# Patient Record
Sex: MALE | Race: WHITE | Employment: OTHER | URBAN - METROPOLITAN AREA
[De-identification: names, ages, dates, MRNs, and addresses within clinical notes are randomized per-mention and may not be internally consistent; named-entity substitution may affect disease eponyms.]

---

## 2017-01-27 PROCEDURE — 84146 ASSAY OF PROLACTIN: CPT | Performed by: INTERNAL MEDICINE

## 2017-03-03 ENCOUNTER — APPOINTMENT (OUTPATIENT)
Dept: LAB | Facility: HOSPITAL | Age: 72
End: 2017-03-03
Attending: INTERNAL MEDICINE
Payer: MEDICARE

## 2017-03-03 DIAGNOSIS — N62 GYNECOMASTIA: ICD-10-CM

## 2017-03-03 LAB
ESTRADIOL: 27.7 PG/ML (ref 0–39.8)
FSH: 9.7 MIU/ML
HCG QUANTITATIVE: <1 MIU/ML (ref ?–10)
LH: 1.9 MIU/ML (ref 3.1–34.6)

## 2017-03-03 PROCEDURE — 82670 ASSAY OF TOTAL ESTRADIOL: CPT

## 2017-03-03 PROCEDURE — 83002 ASSAY OF GONADOTROPIN (LH): CPT

## 2017-03-03 PROCEDURE — 83001 ASSAY OF GONADOTROPIN (FSH): CPT

## 2017-03-03 PROCEDURE — 84403 ASSAY OF TOTAL TESTOSTERONE: CPT

## 2017-03-03 PROCEDURE — 36415 COLL VENOUS BLD VENIPUNCTURE: CPT

## 2017-03-03 PROCEDURE — 84702 CHORIONIC GONADOTROPIN TEST: CPT

## 2017-03-03 PROCEDURE — 84402 ASSAY OF FREE TESTOSTERONE: CPT

## 2017-03-06 LAB
TESTOSTERONE TOTAL: 113 NG/DL
TESTOSTERONE, FREE -MS/MS: 17.3 PG/ML

## 2017-03-08 NOTE — PROGRESS NOTES
Quick Note:    [phone] (home)   [facility] [place] regarding Dr. Jenn Preston result note.  Hours and number given.    ______

## 2017-03-08 NOTE — PROGRESS NOTES
Quick Note:    Pt's testosterone level is decreased as to be expected given age and previous prostate cancer therapy.  Other labs including estrogen normal. As discussed at office visit, regarding pt's painful gynecomastia, pt may considering seeing oncolog

## 2017-03-09 NOTE — PROGRESS NOTES
Quick Note:    [phone] (home)   Telephone Information:  Mobile [phone]    [facility] regarding Dr. Bill Cassidy result note.  Hours and number given.    ______

## 2017-07-31 PROBLEM — B35.1 DERMATOPHYTOSIS, NAIL: Status: ACTIVE | Noted: 2017-07-31

## 2017-11-28 PROBLEM — N62 GYNECOMASTIA: Status: ACTIVE | Noted: 2017-11-28

## 2018-06-21 ENCOUNTER — OFFICE VISIT (OUTPATIENT)
Dept: WOUND CARE | Facility: HOSPITAL | Age: 73
End: 2018-06-21
Attending: NURSE PRACTITIONER
Payer: MEDICARE

## 2018-06-21 DIAGNOSIS — L97.811 NON-PRESSURE CHRONIC ULCER OF OTHER PART OF RIGHT LOWER LEG LIMITED TO BREAKDOWN OF SKIN (HCC): Primary | ICD-10-CM

## 2018-06-21 DIAGNOSIS — I87.2 PERIPHERAL VENOUS INSUFFICIENCY: ICD-10-CM

## 2018-06-21 PROCEDURE — 99214 OFFICE O/P EST MOD 30 MIN: CPT

## 2018-06-25 NOTE — PROGRESS NOTES
Subjective    Chief Complaint  This information was obtained from the patient  Patient is here for a wound to his right leg. Patients wife states that his skin is very dry and he begins to scratching. Patient begins to get scabs.  Patient has poor circulati Former smoker - quit more than 20 years ago, Marital Status - , Occupation - retired, Alcohol Use - none, Tobacco Use - occasional cigar, Caffeine Use - rarely , Lives in - condo    Past Medical History  This information was obtained from the tramaine cyclobenzaprine 10 mg tablet oral tablet oral  Keflex 500 mg capsule oral capsule oral twice daily for 7 days        Objective    Constitutional  BP WNL. Pulse RRR. RR within normal limits. Afebrile. Obesity BMI >30.  Alert, calm, well developed, in no appa Calf Measurement 40 cm from heel with left measurement of 28.9 cm   Ankle Measurement 13 cm from heel with left measurement of 46 cm  Right Extremity:   Calf Measurement 40 cm from heel with right measurement of 29.8 cm   Ankle Measurement 13 cm from heel S/S of Infection  Non-adherence    Additional Orders: Follow-Up Appointments  Return Appointment in 1 week. Care summary  Discussed the Plan of Care at bedside with patient.  The patient verbally acknowledges understanding of all instructions and all

## 2018-06-28 ENCOUNTER — OFFICE VISIT (OUTPATIENT)
Dept: WOUND CARE | Facility: HOSPITAL | Age: 73
End: 2018-06-28
Attending: NURSE PRACTITIONER
Payer: MEDICARE

## 2018-06-28 PROCEDURE — 99213 OFFICE O/P EST LOW 20 MIN: CPT

## 2018-06-28 NOTE — PROGRESS NOTES
Subjective    Chief Complaint  This information was obtained from the patient  Patient is here for a wound care follow up. He denies any pain to his wound.     Allergies  No known allergies    HPI  This information was obtained from the patient  The followi Musculoskeletal: Deformities, Muscle Weakness, Joint Swelling, Assistive Devices  Integumentary (Hair/Skin/Nails): Prone to Skin Tears  Neurological: Abnormal Gait, Numbness, Tingling, Weakness  Hematologic/Lymphatic: Swollen Glands  Psychiatric: Anxiety, Edema Assessment:  Left Extremity: Edema is present   Compression Device In Use: Yes   Device Used Correctly: Yes   Compression Device Used: Tubigrip or Tubifast   Calf Measurement 40 cm from heel   Ankle Measurement 13 cm from heel  Right Extremity: Edema

## 2018-07-05 ENCOUNTER — OFFICE VISIT (OUTPATIENT)
Dept: WOUND CARE | Facility: HOSPITAL | Age: 73
End: 2018-07-05
Attending: NURSE PRACTITIONER
Payer: MEDICARE

## 2018-07-05 DIAGNOSIS — I87.2 PERIPHERAL VENOUS INSUFFICIENCY: ICD-10-CM

## 2018-07-05 DIAGNOSIS — L97.811 NON-PRESSURE CHRONIC ULCER OF OTHER PART OF RIGHT LOWER LEG LIMITED TO BREAKDOWN OF SKIN (HCC): Primary | ICD-10-CM

## 2018-07-05 PROCEDURE — 29581 APPL MULTLAYER CMPRN SYS LEG: CPT

## 2018-07-05 NOTE — PROGRESS NOTES
Subjective    Chief Complaint  This information was obtained from the patient  Patient is here for a wound care follow up to right lower leg. He denies any pain to his wound. Pt comes in with tubigrips in place.  Hydrofera Blue ready was on incorrectly; ins Constitutional Symptoms (General Health):  Fatigue, Fever, Marked Weight Change  Eyes: Blurred Vision, Partial/Complete Blindness  Ear/Nose/Mouth/Throat: Hearing Loss / Aid  Respiratory: Cough, Shortness of Breath, Wheezing  Cardiovascular (Central/Peripher Wound #1 Right, Anterior Lower Leg is a chronic Full Thickness Edema and has received a status of Not Healed. Subsequent wound encounter measurements are 17cm length x 12.8cm width with no measurable depth, with an area of 217.6 sq cm .  No tunneling has be Wound #1 (Edema) is located on the right, anterior lower leg. A Multi-Layer Compression Wrap procedure was performed by Enrique Vallejo RN. A 2 Layers Coflex was applied with high 30-40 mmhg. The procedure was tolerated well.         [address]

## 2018-07-09 ENCOUNTER — OFFICE VISIT (OUTPATIENT)
Dept: WOUND CARE | Facility: HOSPITAL | Age: 73
End: 2018-07-09
Attending: NURSE PRACTITIONER
Payer: MEDICARE

## 2018-07-09 DIAGNOSIS — I87.2 PERIPHERAL VENOUS INSUFFICIENCY: ICD-10-CM

## 2018-07-09 DIAGNOSIS — L97.811 NON-PRESSURE CHRONIC ULCER OF OTHER PART OF RIGHT LOWER LEG LIMITED TO BREAKDOWN OF SKIN (HCC): Primary | ICD-10-CM

## 2018-07-09 PROCEDURE — 29581 APPL MULTLAYER CMPRN SYS LEG: CPT

## 2018-07-12 ENCOUNTER — OFFICE VISIT (OUTPATIENT)
Dept: WOUND CARE | Facility: HOSPITAL | Age: 73
End: 2018-07-12
Attending: NURSE PRACTITIONER
Payer: MEDICARE

## 2018-07-12 DIAGNOSIS — L97.811 NON-PRESSURE CHRONIC ULCER OF OTHER PART OF RIGHT LOWER LEG LIMITED TO BREAKDOWN OF SKIN (HCC): Primary | ICD-10-CM

## 2018-07-12 DIAGNOSIS — I87.2 PERIPHERAL VENOUS INSUFFICIENCY: ICD-10-CM

## 2018-07-12 PROCEDURE — 29581 APPL MULTLAYER CMPRN SYS LEG: CPT

## 2018-07-12 NOTE — PROGRESS NOTES
Subjective    Chief Complaint  This information was obtained from the patient  Patient is here for a wound care follow up. He denies any pain to the wound, and presents with wrap intact.     Allergies  No known allergies    HPI  This information was obtaine Patient denies complaints or symptoms related to:   Constitutional Symptoms (General Health):  Fatigue, Fever, Marked Weight Change  Eyes: Blurred Vision, Partial/Complete Blindness  Ear/Nose/Mouth/Throat: Hearing Loss / Aid  Respiratory: Cough, Shortness o Wound #1 Right, Anterior Lower Leg is a chronic Full Thickness Edema and has received a status of Not Healed. Subsequent wound encounter measurements are 6.2cm length x 8.7cm width with no measurable depth, with an area of 53.94 sq cm .  No tunneling has be Wound #1 (Edema) is located on the right, anterior lower leg. A Multi-Layer Compression Wrap procedure was performed by Devendra Siegel MA. A Multi-Layer Coflex was applied with . The procedure was tolerated well.         Plan    Wound Orders:  Wound #1 Ri

## 2018-07-16 ENCOUNTER — OFFICE VISIT (OUTPATIENT)
Dept: WOUND CARE | Facility: HOSPITAL | Age: 73
End: 2018-07-16
Attending: NURSE PRACTITIONER
Payer: MEDICARE

## 2018-07-16 DIAGNOSIS — I87.2 PERIPHERAL VENOUS INSUFFICIENCY: ICD-10-CM

## 2018-07-16 DIAGNOSIS — L97.811 NON-PRESSURE CHRONIC ULCER OF OTHER PART OF RIGHT LOWER LEG LIMITED TO BREAKDOWN OF SKIN (HCC): Primary | ICD-10-CM

## 2018-07-16 PROCEDURE — 29581 APPL MULTLAYER CMPRN SYS LEG: CPT

## 2018-07-19 ENCOUNTER — OFFICE VISIT (OUTPATIENT)
Dept: WOUND CARE | Facility: HOSPITAL | Age: 73
End: 2018-07-19
Attending: NURSE PRACTITIONER
Payer: MEDICARE

## 2018-07-19 DIAGNOSIS — L97.811 NON-PRESSURE CHRONIC ULCER OF OTHER PART OF RIGHT LOWER LEG LIMITED TO BREAKDOWN OF SKIN (HCC): Primary | ICD-10-CM

## 2018-07-19 DIAGNOSIS — I87.2 PERIPHERAL VENOUS INSUFFICIENCY: ICD-10-CM

## 2018-07-19 PROCEDURE — 29581 APPL MULTLAYER CMPRN SYS LEG: CPT

## 2018-07-23 ENCOUNTER — OFFICE VISIT (OUTPATIENT)
Dept: WOUND CARE | Facility: HOSPITAL | Age: 73
End: 2018-07-23
Attending: NURSE PRACTITIONER
Payer: MEDICARE

## 2018-07-23 DIAGNOSIS — I87.2 PERIPHERAL VENOUS INSUFFICIENCY: ICD-10-CM

## 2018-07-23 DIAGNOSIS — L97.811 NON-PRESSURE CHRONIC ULCER OF OTHER PART OF RIGHT LOWER LEG LIMITED TO BREAKDOWN OF SKIN (HCC): Primary | ICD-10-CM

## 2018-07-23 PROCEDURE — 99212 OFFICE O/P EST SF 10 MIN: CPT

## 2018-07-23 NOTE — PROGRESS NOTES
Subjective    Chief Complaint  This information was obtained from the patient  Patient is here for a follow up visit. Patients stated wrap was rolled on friday.      Allergies  No known allergies    HPI  This information was obtained from the patient  The f Neurological: Memory Loss (Short term memory loss)    Patient denies complaints or symptoms related to:   Constitutional Symptoms (General Health)  Eyes: Blurred Vision, Partial/Complete Blindness  Ear/Nose/Mouth/Throat: Hearing Loss / Aid  Respiratory: Co The periwound skin texture is normal. The periwound skin exhibited: Dry/Scaly, Hemosiderosis. The temperature of the periwound skin is WNL. Periwound skin does not exhibit signs or symptoms of infection.  Local Pulse is Normal.    Psychiatric:  Intact short Follow-Up Appointments  Discharge from Outpatient Services. Care summary  Discussed the Plan of Care at bedside with patient. The patient verbally acknowledges understanding of all instructions and all questions were answered.    Workflow: Venous  Perfus

## 2018-12-27 PROCEDURE — 84425 ASSAY OF VITAMIN B-1: CPT | Performed by: OTHER

## 2018-12-27 PROCEDURE — 86618 LYME DISEASE ANTIBODY: CPT | Performed by: OTHER

## 2018-12-27 PROCEDURE — 83921 ORGANIC ACID SINGLE QUANT: CPT | Performed by: OTHER

## 2019-10-15 ENCOUNTER — LAB ENCOUNTER (OUTPATIENT)
Dept: LAB | Facility: HOSPITAL | Age: 74
End: 2019-10-15
Attending: PAIN MEDICINE
Payer: MEDICARE

## 2019-10-15 DIAGNOSIS — N28.9 RENAL INSUFFICIENCY: Primary | ICD-10-CM

## 2019-10-15 PROCEDURE — 82565 ASSAY OF CREATININE: CPT

## 2019-10-15 PROCEDURE — 82570 ASSAY OF URINE CREATININE: CPT

## 2019-10-15 PROCEDURE — 36415 COLL VENOUS BLD VENIPUNCTURE: CPT

## 2020-07-30 PROBLEM — L97.909 CHRONIC SKIN ULCER OF LOWER LEG (HCC): Status: ACTIVE | Noted: 2020-07-30

## 2020-08-11 ENCOUNTER — OFFICE VISIT (OUTPATIENT)
Dept: WOUND CARE | Facility: HOSPITAL | Age: 75
End: 2020-08-11
Attending: NURSE PRACTITIONER
Payer: MEDICARE

## 2020-08-11 DIAGNOSIS — L97.929 CHRONIC VENOUS HYPERTENSION (IDIOPATHIC) WITH ULCER AND INFLAMMATION OF BILATERAL LOWER EXTREMITY (HCC): ICD-10-CM

## 2020-08-11 DIAGNOSIS — I89.0 LYMPHEDEMA, NOT ELSEWHERE CLASSIFIED: ICD-10-CM

## 2020-08-11 DIAGNOSIS — L97.812 NON-PRESSURE CHRONIC ULCER OF OTHER PART OF RIGHT LOWER LEG WITH FAT LAYER EXPOSED (HCC): ICD-10-CM

## 2020-08-11 DIAGNOSIS — L97.919 CHRONIC VENOUS HYPERTENSION (IDIOPATHIC) WITH ULCER AND INFLAMMATION OF BILATERAL LOWER EXTREMITY (HCC): ICD-10-CM

## 2020-08-11 DIAGNOSIS — L97.802 NON-PRESSURE CHRONIC ULCER OF OTHER PART OF UNSPECIFIED LOWER LEG WITH FAT LAYER EXPOSED (HCC): Primary | ICD-10-CM

## 2020-08-11 DIAGNOSIS — I87.333 CHRONIC VENOUS HYPERTENSION (IDIOPATHIC) WITH ULCER AND INFLAMMATION OF BILATERAL LOWER EXTREMITY (HCC): ICD-10-CM

## 2020-08-11 PROCEDURE — 29581 APPL MULTLAYER CMPRN SYS LEG: CPT

## 2020-08-11 PROCEDURE — 99215 OFFICE O/P EST HI 40 MIN: CPT

## 2020-08-11 NOTE — PROGRESS NOTES
Subjective    Chief Complaint  This information was obtained from the patient, chart  Patient is here for treatment and management of a traumatic wounds to BLE. Per spouse,patient scratches his legs all the time and doesn't wear his compression stockings. medical history of:  Prostate cancer  Neuropathy  Thyroid issue (young age)  RSD    Surgical History  This information was obtained from the patient, chart  Patient has a surgical history of:   Total knee replacement  Colonoscopy  Upper GI, endoscopy    Rev mg tablet  zolpidem - oral 10 mg tablet  venlafaxine - oral 75 mg capsule,extended release 24hr  cyclobenzaprine - oral 10 mg tablet  nystatin - topical 100,000 unit/gram cream        Objective    Wound Assessment(s)  Wound #2 Left, Anterior Lower Leg is a Normal.    Wound #4 Right, Lateral Lower Leg is an acute Full Thickness Venous Ulcer and has received a status of Not Healed.  Initial wound encounter measurements are 1cm length x 0.6cm width x 0.1cm depth, with an area of 0.6 sq cm and a volume of 0.06 cu unlabored. Temperature wnl. elevated BMI. Appearance neat and clean. Appears in no acute distress. Well nourished and well developed. Respiratory:  Respiratory effort is easy and symmetric bilaterally. Rate is normal at rest and on room air .  Bilateral procedure was tolerated well. Plan    Wound Orders:  Wound #2 Left, Anterior Lower Leg    Wound Cleansing & Dressings:  May shower with protection.   Cleanse with Vashe  Steroid cream/ointment  Kerramax/Super absorbent  Change Dressing Every: - twice Albumin Date and Value: - Jan 2020 albumin 3.0/tp6.7        Discussed with patient the aspects of wound healing including:  blood flow in/out (arterial vs venous vs lymph) discussed the importance of compression, wound bed optimization including moist woun

## 2020-08-14 ENCOUNTER — OFFICE VISIT (OUTPATIENT)
Dept: WOUND CARE | Facility: HOSPITAL | Age: 75
End: 2020-08-14
Attending: NURSE PRACTITIONER
Payer: MEDICARE

## 2020-08-14 DIAGNOSIS — L97.802 NON-PRESSURE CHRONIC ULCER OF OTHER PART OF UNSPECIFIED LOWER LEG WITH FAT LAYER EXPOSED (HCC): Primary | ICD-10-CM

## 2020-08-14 DIAGNOSIS — I87.333 CHRONIC VENOUS HYPERTENSION (IDIOPATHIC) WITH ULCER AND INFLAMMATION OF BILATERAL LOWER EXTREMITY (HCC): ICD-10-CM

## 2020-08-14 DIAGNOSIS — L97.812 NON-PRESSURE CHRONIC ULCER OF OTHER PART OF RIGHT LOWER LEG WITH FAT LAYER EXPOSED (HCC): ICD-10-CM

## 2020-08-14 DIAGNOSIS — I89.0 LYMPHEDEMA, NOT ELSEWHERE CLASSIFIED: ICD-10-CM

## 2020-08-14 DIAGNOSIS — L97.929 CHRONIC VENOUS HYPERTENSION (IDIOPATHIC) WITH ULCER AND INFLAMMATION OF BILATERAL LOWER EXTREMITY (HCC): ICD-10-CM

## 2020-08-14 DIAGNOSIS — L97.919 CHRONIC VENOUS HYPERTENSION (IDIOPATHIC) WITH ULCER AND INFLAMMATION OF BILATERAL LOWER EXTREMITY (HCC): ICD-10-CM

## 2020-08-14 PROCEDURE — 29581 APPL MULTLAYER CMPRN SYS LEG: CPT

## 2020-08-18 ENCOUNTER — APPOINTMENT (OUTPATIENT)
Dept: WOUND CARE | Facility: HOSPITAL | Age: 75
End: 2020-08-18
Attending: NURSE PRACTITIONER
Payer: MEDICARE

## 2020-08-19 ENCOUNTER — APPOINTMENT (OUTPATIENT)
Dept: WOUND CARE | Facility: HOSPITAL | Age: 75
End: 2020-08-19
Attending: NURSE PRACTITIONER
Payer: MEDICARE

## 2020-08-21 ENCOUNTER — OFFICE VISIT (OUTPATIENT)
Dept: WOUND CARE | Facility: HOSPITAL | Age: 75
End: 2020-08-21
Attending: NURSE PRACTITIONER
Payer: MEDICARE

## 2020-08-21 DIAGNOSIS — L97.812 NON-PRESSURE CHRONIC ULCER OF OTHER PART OF RIGHT LOWER LEG WITH FAT LAYER EXPOSED (HCC): ICD-10-CM

## 2020-08-21 DIAGNOSIS — I89.0 LYMPHEDEMA, NOT ELSEWHERE CLASSIFIED: ICD-10-CM

## 2020-08-21 DIAGNOSIS — I87.333 CHRONIC VENOUS HYPERTENSION (IDIOPATHIC) WITH ULCER AND INFLAMMATION OF BILATERAL LOWER EXTREMITY (HCC): ICD-10-CM

## 2020-08-21 DIAGNOSIS — L97.929 CHRONIC VENOUS HYPERTENSION (IDIOPATHIC) WITH ULCER AND INFLAMMATION OF BILATERAL LOWER EXTREMITY (HCC): ICD-10-CM

## 2020-08-21 DIAGNOSIS — L97.802 NON-PRESSURE CHRONIC ULCER OF OTHER PART OF UNSPECIFIED LOWER LEG WITH FAT LAYER EXPOSED (HCC): Primary | ICD-10-CM

## 2020-08-21 DIAGNOSIS — L97.919 CHRONIC VENOUS HYPERTENSION (IDIOPATHIC) WITH ULCER AND INFLAMMATION OF BILATERAL LOWER EXTREMITY (HCC): ICD-10-CM

## 2020-08-21 PROCEDURE — 29581 APPL MULTLAYER CMPRN SYS LEG: CPT

## 2020-08-21 NOTE — PROGRESS NOTES
Subjective    Chief Complaint  This information was obtained from the patient, chart  Patient is here for a follow visit for BLE wounds. Allergies  No known allergies    HPI  This information was obtained from the patient, caregiver and chart.   The foll treated)  Cardiovascular (Central/Peripheral): Edema  Musculoskeletal: Other (right knee surgeries x 2)  Integumentary (Hair/Skin/Nails):  Hyperpigmentation, Ulcers  Neurological: Memory Loss (Short term memory loss), Other (reflex sympathetic dystrophy)  P exhibit: Brawny Induration. The temperature of the periwound skin is Warm. Periwound skin does not exhibit signs or symptoms of infection.  Local Pulse is Normal.    Wound #3 Right, Medial Lower Leg is an acute Full Thickness Venous Ulcer and has received a agitation. Calm, cooperative, and communicative. Appropriate interactions and affect.         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 Wound type: - venous/lymphadema  Wound improving. No s/s of infection. Perfusion assessed by palpation of pulses.   Last Albumin Date and Value: - Jan 2020 albumin 3.0/tp6.7    Follow-Up Appointments:  A follow-up appointment carlos alberto

## 2020-08-26 ENCOUNTER — APPOINTMENT (OUTPATIENT)
Dept: WOUND CARE | Facility: HOSPITAL | Age: 75
End: 2020-08-26
Attending: NURSE PRACTITIONER
Payer: MEDICARE

## 2020-08-28 ENCOUNTER — OFFICE VISIT (OUTPATIENT)
Dept: WOUND CARE | Facility: HOSPITAL | Age: 75
End: 2020-08-28
Attending: NURSE PRACTITIONER
Payer: MEDICARE

## 2020-08-28 DIAGNOSIS — L97.812 NON-PRESSURE CHRONIC ULCER OF OTHER PART OF RIGHT LOWER LEG WITH FAT LAYER EXPOSED (HCC): ICD-10-CM

## 2020-08-28 DIAGNOSIS — L97.919 CHRONIC VENOUS HYPERTENSION (IDIOPATHIC) WITH ULCER AND INFLAMMATION OF BILATERAL LOWER EXTREMITY (HCC): ICD-10-CM

## 2020-08-28 DIAGNOSIS — I89.0 LYMPHEDEMA, NOT ELSEWHERE CLASSIFIED: ICD-10-CM

## 2020-08-28 DIAGNOSIS — I87.333 CHRONIC VENOUS HYPERTENSION (IDIOPATHIC) WITH ULCER AND INFLAMMATION OF BILATERAL LOWER EXTREMITY (HCC): ICD-10-CM

## 2020-08-28 DIAGNOSIS — L97.802 NON-PRESSURE CHRONIC ULCER OF OTHER PART OF UNSPECIFIED LOWER LEG WITH FAT LAYER EXPOSED (HCC): Primary | ICD-10-CM

## 2020-08-28 DIAGNOSIS — L97.929 CHRONIC VENOUS HYPERTENSION (IDIOPATHIC) WITH ULCER AND INFLAMMATION OF BILATERAL LOWER EXTREMITY (HCC): ICD-10-CM

## 2020-08-28 PROCEDURE — 29581 APPL MULTLAYER CMPRN SYS LEG: CPT

## 2020-08-28 NOTE — PROGRESS NOTES
Subjective    Chief Complaint  This information was obtained from the patient, chart  Patient is here for a follow visit for BLE wounds. Allergies  No known allergies    HPI  This information was obtained from the patient, caregiver and chart.   The foll wrap falling/being removed, maybe the increase in humidity/heat and new wrap,  patient now has the area open on one leg, both legs have an increase in edema. will re-wrap and have him come back early next week for transition.     Review of Systems (ROS)  T volume of 0.06 cubic cm. No tunneling has been noted. No sinus tract has been noted. No undermining has been noted. There is a small amount of sero-sanguineous drainage noted which has no odor. The patient reports a wound pain of level 0/10.  The wound laura palpable bilaterally. Extremities + scant varicosities, edema bilaterally increased, hyperpigmentation, excoriations on left. Capillary refill < 3 seconds. Digits are warm. toenails are wnl for color, thickness and hygeine. no hairgrowth on legs and feet. Yanelis Mcdonald week.    Misc/Additional Orders:  Supplement with a daily multivitamin  Increase protein into diet  Start or continue taking Jarrett  Decrease salt intake  S/S of Infection  Non-adherence    Wound #3 Right, Medial Lower Leg    Compression Therapy:  Yue

## 2020-09-01 ENCOUNTER — OFFICE VISIT (OUTPATIENT)
Dept: WOUND CARE | Facility: HOSPITAL | Age: 75
End: 2020-09-01
Attending: NURSE PRACTITIONER
Payer: MEDICARE

## 2020-09-01 DIAGNOSIS — I87.333 CHRONIC VENOUS HYPERTENSION (IDIOPATHIC) WITH ULCER AND INFLAMMATION OF BILATERAL LOWER EXTREMITY (HCC): ICD-10-CM

## 2020-09-01 DIAGNOSIS — L97.919 CHRONIC VENOUS HYPERTENSION (IDIOPATHIC) WITH ULCER AND INFLAMMATION OF BILATERAL LOWER EXTREMITY (HCC): ICD-10-CM

## 2020-09-01 DIAGNOSIS — L97.929 CHRONIC VENOUS HYPERTENSION (IDIOPATHIC) WITH ULCER AND INFLAMMATION OF BILATERAL LOWER EXTREMITY (HCC): ICD-10-CM

## 2020-09-01 DIAGNOSIS — L97.812 NON-PRESSURE CHRONIC ULCER OF OTHER PART OF RIGHT LOWER LEG WITH FAT LAYER EXPOSED (HCC): ICD-10-CM

## 2020-09-01 DIAGNOSIS — L97.802 NON-PRESSURE CHRONIC ULCER OF OTHER PART OF UNSPECIFIED LOWER LEG WITH FAT LAYER EXPOSED (HCC): Primary | ICD-10-CM

## 2020-09-01 DIAGNOSIS — I89.0 LYMPHEDEMA, NOT ELSEWHERE CLASSIFIED: ICD-10-CM

## 2020-09-01 PROCEDURE — 99213 OFFICE O/P EST LOW 20 MIN: CPT

## 2020-09-01 NOTE — PROGRESS NOTES
Subjective    Chief Complaint  This information was obtained from the patient, chart  Patient is here for a follow visit for BLE wounds.  He denies any issues with the wrap and no pain on the wound    Allergies  No known allergies    HPI  This information w bring his compression stockings with.  however due to the wrap falling/being removed, maybe the increase in humidity/heat and new wrap,  patient now has the area open on one leg, both legs have an increase in edema.   will re-wrap and have him come back ear Information  Medication reconciliation completed at today's visit. : Yes        Objective    Wound Assessment(s)  Wound #2 Left, Anterior Lower Leg is an acute Full Thickness Venous Ulcer and has received an outcome of Resolved.  Subsequent wound encounter Calm, cooperative, and communicative. Appropriate interactions and affect.         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

## 2020-12-03 ENCOUNTER — OFFICE VISIT (OUTPATIENT)
Dept: NEUROLOGY | Facility: CLINIC | Age: 75
End: 2020-12-03
Payer: MEDICARE

## 2020-12-03 VITALS
RESPIRATION RATE: 16 BRPM | HEART RATE: 96 BPM | BODY MASS INDEX: 42 KG/M2 | SYSTOLIC BLOOD PRESSURE: 140 MMHG | DIASTOLIC BLOOD PRESSURE: 90 MMHG | WEIGHT: 308 LBS

## 2020-12-03 DIAGNOSIS — M79.662 PAIN AND SWELLING OF LOWER LEG, LEFT: Primary | ICD-10-CM

## 2020-12-03 DIAGNOSIS — M79.89 PAIN AND SWELLING OF RIGHT LOWER LEG: ICD-10-CM

## 2020-12-03 DIAGNOSIS — G90.521 COMPLEX REGIONAL PAIN SYNDROME TYPE 1 OF RIGHT LOWER EXTREMITY: ICD-10-CM

## 2020-12-03 DIAGNOSIS — M79.89 PAIN AND SWELLING OF LOWER LEG, LEFT: Primary | ICD-10-CM

## 2020-12-03 DIAGNOSIS — M79.661 PAIN AND SWELLING OF RIGHT LOWER LEG: ICD-10-CM

## 2020-12-03 PROCEDURE — 99203 OFFICE O/P NEW LOW 30 MIN: CPT | Performed by: PHYSICIAN ASSISTANT

## 2020-12-03 NOTE — PROGRESS NOTES
[facility] with [address] [place]  11/24/1945  Primary Care Provider:  Saida Bustos MD    12/3/2020  Accompanied visit: Yes- Wife        77year old male patient being seen for Take 1 capsule (0.4 mg total) by mouth nightly., Disp: 60 capsule, Rfl: 3  •  cloNIDine (CATAPRES) 0.05 mg Oral Tab, Take 0.025 mg by mouth 2 (two) times daily. , Disp: , Rfl:   PRN:     Allergies:  No Known Allergies         EXAM:  /90 (BP Location: E[place]a luisari. Plan was discussed with patient and wife and they expressed full understanding.

## 2020-12-05 PROBLEM — G90.529 CRPS (COMPLEX REGIONAL PAIN SYNDROME), LOWER LIMB: Status: ACTIVE | Noted: 2020-12-05

## 2020-12-14 ENCOUNTER — TELEPHONE (OUTPATIENT)
Dept: PAIN CLINIC | Facility: CLINIC | Age: 75
End: 2020-12-14

## 2020-12-15 PROBLEM — R60.0 BILATERAL LEG EDEMA: Status: ACTIVE | Noted: 2020-12-15

## 2021-01-04 ENCOUNTER — OFFICE VISIT (OUTPATIENT)
Dept: PAIN CLINIC | Facility: CLINIC | Age: 76
End: 2021-01-04
Payer: MEDICARE

## 2021-01-04 VITALS
WEIGHT: 300 LBS | RESPIRATION RATE: 16 BRPM | SYSTOLIC BLOOD PRESSURE: 138 MMHG | HEIGHT: 72 IN | OXYGEN SATURATION: 96 % | BODY MASS INDEX: 40.63 KG/M2 | HEART RATE: 78 BPM | DIASTOLIC BLOOD PRESSURE: 86 MMHG

## 2021-01-04 DIAGNOSIS — G57.70 COMPLEX REGIONAL PAIN SYNDROME TYPE 2 OF LOWER EXTREMITY, UNSPECIFIED LATERALITY: Primary | ICD-10-CM

## 2021-01-04 PROCEDURE — 99203 OFFICE O/P NEW LOW 30 MIN: CPT | Performed by: ANESTHESIOLOGY

## 2021-01-04 NOTE — H&P
Name: Roselyn Armstrong III   : 1945   DOS: 2021     Chief complaint: Right lower extremity pain    History of present illness:   Betsy Crain is a 77year old male accompanied by his wife today for initial consultation regarding complex r Past Surgical History:   Procedure Laterality Date   • COLONOSCOPY N/A 12/2/2014    Performed by Oniel Cantu MD at [facility] ENDOSCOPY   • ESOPHAGOGASTRODUODENOSCOPY (EGD) N/A 12/2/2014    Performed by Oniel Cantu MD at [facility] ENDOSCOPY   • SKIN SURGERY R pressure without any symptomatic complaint. Radiology diagnostic studies:   No imaging    Assessment:  Complex regional pain syndrome type 2 of lower extremity, unspecified laterality  (primary encounter diagnosis).       Plan: The patient is a 66-year-o

## 2021-01-04 NOTE — PROGRESS NOTES
Location of Pain: right knee    Date Pain Began: 2000          Work Related:   No        Receiving Work Comp/Disability:   No    Numeric Rating Scale:  Pain at Present:  1

## 2021-02-11 DIAGNOSIS — Z23 NEED FOR VACCINATION: ICD-10-CM

## 2021-04-12 PROBLEM — L97.919 CHRONIC VENOUS HYPERTENSION (IDIOPATHIC) WITH ULCER AND INFLAMMATION OF BILATERAL LOWER EXTREMITY (HCC): Status: ACTIVE | Noted: 2021-04-12

## 2021-04-12 PROBLEM — L97.929 CHRONIC VENOUS HYPERTENSION (IDIOPATHIC) WITH ULCER AND INFLAMMATION OF BILATERAL LOWER EXTREMITY (HCC): Status: ACTIVE | Noted: 2021-04-12

## 2021-04-12 PROBLEM — I87.333 CHRONIC VENOUS HYPERTENSION (IDIOPATHIC) WITH ULCER AND INFLAMMATION OF BILATERAL LOWER EXTREMITY (HCC): Status: ACTIVE | Noted: 2021-04-12

## 2021-04-23 ENCOUNTER — APPOINTMENT (OUTPATIENT)
Dept: WOUND CARE | Facility: HOSPITAL | Age: 76
End: 2021-04-23
Attending: NURSE PRACTITIONER
Payer: MEDICARE

## 2021-10-05 PROBLEM — E66.01 OBESITY, MORBID (HCC): Status: ACTIVE | Noted: 2021-10-05

## 2021-10-12 NOTE — ED PROVIDER NOTES
Patient Seen in: [facility] Emergency Department      History   Patient presents with:  Eval-P    Stated Complaint: hallucinations    Subjective:   HPI    70-year-old male presents emergency department by ambulance for evaluation.   Initially it was reviewed and negative except as noted above.     Physical Exam     ED Triage Vitals   BP 10/12/21 0305 (!) 167/85   Pulse 10/12/21 0305 86   Resp 10/12/21 0309 17   Temp 10/12/21 0309 97.8 °F (36.6 °C)   Temp src 10/12/21 0309 Temporal   SpO2 10/12/21 0305 WITH DIFFERENTIAL WITH PLATELET   URINALYSIS WITH CULTURE REFLEX   ETHYL ALCOHOL   DRUG SCREEN 7 W/OUT CONFIRMATION, URINE   RAPID SARS-COV-2 BY PCR          Patient was evaluated in the emergency department initially had psychiatric labs drawn however pat errors may occur. When identified these errors have been corrected.  While every attempt is made to correct errors during dictation discrepancies may still exist                             Disposition and Plan     Clinical Impression:  Agitation  (primary
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
## 2021-11-20 ENCOUNTER — APPOINTMENT (OUTPATIENT)
Dept: GENERAL RADIOLOGY | Facility: HOSPITAL | Age: 76
End: 2021-11-20
Attending: EMERGENCY MEDICINE
Payer: MEDICARE

## 2021-11-20 ENCOUNTER — HOSPITAL ENCOUNTER (EMERGENCY)
Facility: HOSPITAL | Age: 76
Discharge: NURSING, REHAB OR LONG-TERM CARE | End: 2021-11-21
Attending: EMERGENCY MEDICINE
Payer: MEDICARE

## 2021-11-20 DIAGNOSIS — F41.9 ANXIETY: ICD-10-CM

## 2021-11-20 DIAGNOSIS — R45.1 AGITATION: ICD-10-CM

## 2021-11-20 DIAGNOSIS — F22 PARANOID DELUSION (HCC): ICD-10-CM

## 2021-11-20 DIAGNOSIS — K59.00 CONSTIPATION, UNSPECIFIED CONSTIPATION TYPE: Primary | ICD-10-CM

## 2021-11-20 PROCEDURE — 74018 RADEX ABDOMEN 1 VIEW: CPT | Performed by: EMERGENCY MEDICINE

## 2021-11-20 RX ORDER — LORAZEPAM 2 MG/ML
1 INJECTION INTRAMUSCULAR ONCE
Status: COMPLETED | OUTPATIENT
Start: 2021-11-20 | End: 2021-11-20

## 2021-11-20 RX ORDER — ACETAMINOPHEN 325 MG/1
650 TABLET ORAL ONCE
Status: COMPLETED | OUTPATIENT
Start: 2021-11-20 | End: 2021-11-20

## 2021-11-20 RX ORDER — ZOLPIDEM TARTRATE 5 MG/1
20 TABLET ORAL NIGHTLY PRN
Status: DISCONTINUED | OUTPATIENT
Start: 2021-11-20 | End: 2021-11-21

## 2021-11-20 RX ORDER — LORAZEPAM 2 MG/ML
0.5 INJECTION INTRAMUSCULAR ONCE
Status: COMPLETED | OUTPATIENT
Start: 2021-11-20 | End: 2021-11-20

## 2021-11-20 RX ORDER — LORAZEPAM 2 MG/ML
INJECTION INTRAMUSCULAR
Status: COMPLETED
Start: 2021-11-20 | End: 2021-11-20

## 2021-11-20 RX ORDER — VENLAFAXINE HYDROCHLORIDE 75 MG/1
75 CAPSULE, EXTENDED RELEASE ORAL 2 TIMES DAILY
Status: DISCONTINUED | OUTPATIENT
Start: 2021-11-20 | End: 2021-11-21

## 2021-11-20 RX ORDER — TRAZODONE HYDROCHLORIDE 50 MG/1
100 TABLET ORAL NIGHTLY
Status: DISCONTINUED | OUTPATIENT
Start: 2021-11-20 | End: 2021-11-21

## 2021-11-20 RX ORDER — QUETIAPINE 25 MG/1
25 TABLET, FILM COATED ORAL NIGHTLY
Status: DISCONTINUED | OUTPATIENT
Start: 2021-11-20 | End: 2021-11-21

## 2021-11-20 NOTE — ED INITIAL ASSESSMENT (HPI)
Pt arrives to the ED following an acute exacerbation of his dementia this morning. Per pt's wife and Tori Mckeon, pt stated that he did not \"want to be arrested for murdering people\".  Pt arrives stating \"I am sorry, I do not remember this morning, I k

## 2021-11-20 NOTE — ED PROVIDER NOTES
Patient Seen in: [facility] Emergency Department      History   Patient presents with:  Eval-P    Stated Complaint:     Subjective:   HPI    Patient presents for mental health assessment.   The patient has dementia and has been having some behavioral Years: 20.00        Pack years: 20        Types: Cigarettes, Cigars      Smokeless tobacco: Never Used      Tobacco comment: quit smoking cigars in 2013 quit cigarrettes in 1970\"s    Vaping Use      Vaping Use: Never used    Alcohol use: No    Drug use: N Notable for the following components:    WBC 11.7 (*)     Neutrophil Absolute Prelim 9.32 (*)     Neutrophil Absolute 9.32 (*)     All other components within normal limits   ETHYL ALCOHOL - Normal   LIPASE - Normal   SARS-COV-2 BY PCR (GENEXPERT) - Normal given Tylenol. He started to become a little anxious and agitated and was sedated with Ativan as needed.        MDM      The patient has been assessed by crisis and they agree with the need for inpatient mental health treatment to hopefully initiate some m

## 2021-11-20 NOTE — ED QUICK NOTES
Patient's items secured in smart safe bag #[id number]. Items include shirt, sweatshirt, jeans, socks and shoes. Phone is with wife.

## 2021-11-21 VITALS
WEIGHT: 280 LBS | BODY MASS INDEX: 37.11 KG/M2 | OXYGEN SATURATION: 95 % | DIASTOLIC BLOOD PRESSURE: 99 MMHG | HEIGHT: 73 IN | RESPIRATION RATE: 18 BRPM | SYSTOLIC BLOOD PRESSURE: 139 MMHG | TEMPERATURE: 99 F | HEART RATE: 85 BPM

## 2021-11-21 PROBLEM — F03.92: Status: ACTIVE | Noted: 2021-11-21

## 2021-11-21 PROBLEM — F03.90: Status: ACTIVE | Noted: 2021-11-21

## 2021-11-21 PROBLEM — F22: Status: ACTIVE | Noted: 2021-11-21

## 2021-11-21 PROCEDURE — 90792 PSYCH DIAG EVAL W/MED SRVCS: CPT | Performed by: OTHER

## 2021-11-21 RX ORDER — LORAZEPAM 2 MG/ML
1 INJECTION INTRAMUSCULAR EVERY 4 HOURS PRN
Status: DISCONTINUED | OUTPATIENT
Start: 2021-11-21 | End: 2021-11-21

## 2021-11-21 RX ORDER — HALOPERIDOL 5 MG/ML
2 INJECTION INTRAMUSCULAR ONCE
Status: COMPLETED | OUTPATIENT
Start: 2021-11-21 | End: 2021-11-21

## 2021-11-21 RX ORDER — ACETAMINOPHEN 500 MG
1000 TABLET ORAL ONCE
Status: DISCONTINUED | OUTPATIENT
Start: 2021-11-21 | End: 2021-11-21

## 2021-11-21 RX ORDER — ACETAMINOPHEN 325 MG/1
650 TABLET ORAL ONCE
Status: COMPLETED | OUTPATIENT
Start: 2021-11-21 | End: 2021-11-21

## 2021-11-21 RX ORDER — LORAZEPAM 1 MG/1
1 TABLET ORAL EVERY 4 HOURS PRN
Status: DISCONTINUED | OUTPATIENT
Start: 2021-11-21 | End: 2021-11-21

## 2021-11-21 RX ORDER — VENLAFAXINE HYDROCHLORIDE 75 MG/1
150 CAPSULE, EXTENDED RELEASE ORAL 2 TIMES DAILY
Status: DISCONTINUED | OUTPATIENT
Start: 2021-11-21 | End: 2021-11-21

## 2021-11-21 RX ORDER — QUETIAPINE 25 MG/1
50 TABLET, FILM COATED ORAL NIGHTLY
Status: DISCONTINUED | OUTPATIENT
Start: 2021-11-21 | End: 2021-11-21

## 2021-11-21 RX ORDER — ACETAMINOPHEN 500 MG
1000 TABLET ORAL ONCE
Status: COMPLETED | OUTPATIENT
Start: 2021-11-21 | End: 2021-11-21

## 2021-11-21 RX ORDER — HALOPERIDOL 5 MG/ML
INJECTION INTRAMUSCULAR
Status: COMPLETED
Start: 2021-11-21 | End: 2021-11-21

## 2021-11-21 RX ORDER — CLONIDINE HYDROCHLORIDE 0.1 MG/1
0.05 TABLET ORAL 2 TIMES DAILY
Status: DISCONTINUED | OUTPATIENT
Start: 2021-11-21 | End: 2021-11-21

## 2021-11-21 RX ORDER — GABAPENTIN 400 MG/1
800 CAPSULE ORAL DAILY
Status: DISCONTINUED | OUTPATIENT
Start: 2021-11-21 | End: 2021-11-21

## 2021-11-21 RX ORDER — LORAZEPAM 2 MG/ML
INJECTION INTRAMUSCULAR
Status: COMPLETED
Start: 2021-11-21 | End: 2021-11-21

## 2021-11-21 RX ORDER — HALOPERIDOL 2 MG/1
2 TABLET ORAL EVERY 4 HOURS PRN
Status: DISCONTINUED | OUTPATIENT
Start: 2021-11-21 | End: 2021-11-21

## 2021-11-21 RX ORDER — LORAZEPAM 2 MG/ML
2 INJECTION INTRAMUSCULAR ONCE
Status: COMPLETED | OUTPATIENT
Start: 2021-11-21 | End: 2021-11-21

## 2021-11-21 RX ORDER — GABAPENTIN 400 MG/1
400 CAPSULE ORAL NIGHTLY
Status: DISCONTINUED | OUTPATIENT
Start: 2021-11-21 | End: 2021-11-21

## 2021-11-21 RX ORDER — HALOPERIDOL 5 MG/ML
2 INJECTION INTRAMUSCULAR EVERY 4 HOURS PRN
Status: DISCONTINUED | OUTPATIENT
Start: 2021-11-21 | End: 2021-11-21

## 2021-11-21 RX ORDER — TAMSULOSIN HYDROCHLORIDE 0.4 MG/1
0.4 CAPSULE ORAL NIGHTLY
Status: DISCONTINUED | OUTPATIENT
Start: 2021-11-21 | End: 2021-11-21

## 2021-11-21 NOTE — CERTIFICATION
Ref: [address] [facility] 5/3-403, 5/3-602, 5/3-607, 5/3-610    5/3-702, 5/3-813, 5/4-306, 5/4-402, 5/4-403    5/4-405, 5/4-501, 5/4-611, 3/6-714   Inpatient Certificate  Re: Roselyn Armstrong III    (name)     I personally informed the above-named individual of the treated on an inpatient basis, is reasonably expected based on his or her behavioral history, to suffer mental or emotional deterioration and is reasonably expected, after such deterioration, to meet the criteria of either paragraph one or paragraph two ab

## 2021-11-21 NOTE — ED QUICK NOTES
EMS arrives to transport pt to [facility]. Pt became agitated and attempted to leave without approval through ambulance bay. Several staff present attempting to have pt return to ER and room. MD notified and orders received.

## 2021-11-21 NOTE — BH PROGRESS NOTE
Writer spoke with Dr. Kip Cabral who accepted the case. Writer gave report to Brie Juárez RN on Geriatric Unit. Writer spoke with Jewels Devi RN from ED and gave contact information to give N2N.      Around 1300: Spoke with Jewels Devi who states transport has been set up
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
## 2021-11-22 PROBLEM — E66.9 OBESITY: Status: ACTIVE | Noted: 2021-11-22

## 2021-11-23 PROBLEM — E78.00 HYPERCHOLESTEROLEMIA WITHOUT HYPERTRIGLYCERIDEMIA: Status: ACTIVE | Noted: 2021-11-23

## 2021-11-25 PROBLEM — F22: Status: RESOLVED | Noted: 2021-11-21 | Resolved: 2021-11-25

## 2021-11-25 PROBLEM — F03.90: Status: RESOLVED | Noted: 2021-11-21 | Resolved: 2021-11-25

## 2021-11-25 PROBLEM — F03.92: Status: RESOLVED | Noted: 2021-11-21 | Resolved: 2021-11-25

## 2021-12-03 PROBLEM — E66.01 OBESITY, MORBID (HCC): Status: RESOLVED | Noted: 2021-10-05 | Resolved: 2021-12-03

## 2021-12-03 PROBLEM — I10 HYPERTENSION: Status: ACTIVE | Noted: 2021-12-03

## 2021-12-22 ENCOUNTER — OFFICE VISIT (OUTPATIENT)
Dept: PAIN CLINIC | Facility: CLINIC | Age: 76
End: 2021-12-22
Payer: MEDICARE

## 2021-12-22 VITALS
OXYGEN SATURATION: 98 % | DIASTOLIC BLOOD PRESSURE: 80 MMHG | WEIGHT: 290 LBS | HEART RATE: 75 BPM | SYSTOLIC BLOOD PRESSURE: 120 MMHG | BODY MASS INDEX: 38 KG/M2

## 2021-12-22 DIAGNOSIS — G57.70 COMPLEX REGIONAL PAIN SYNDROME TYPE 2 OF LOWER EXTREMITY, UNSPECIFIED LATERALITY: Primary | ICD-10-CM

## 2021-12-22 PROCEDURE — 99214 OFFICE O/P EST MOD 30 MIN: CPT | Performed by: ANESTHESIOLOGY

## 2021-12-22 RX ORDER — CLONIDINE HYDROCHLORIDE 0.1 MG/1
0.1 TABLET ORAL 2 TIMES DAILY
COMMUNITY

## 2021-12-22 RX ORDER — GABAPENTIN 300 MG/1
300 CAPSULE ORAL 3 TIMES DAILY
COMMUNITY
End: 2021-12-28

## 2021-12-22 NOTE — PROGRESS NOTES
Patient presents in office today with reported pain in right knee    Current pain level reported = 0/10    Last reported dose of nothing      Narcotic Contract renewal na    Urine Drug screen na

## 2021-12-22 NOTE — PROGRESS NOTES
Name: Karen Chavez III   : 1945   DOS: 2021     Pain Clinic Follow Up Visit:   Patient presents with:   Follow - Up: discuss right knee pain       Noah Richardson is a 77year old male who presents today for follow-up regarding right k total) by mouth nightly. 30 tablet 3   • OLANZapine 5 MG Oral Tab Take 1 tablet (5 mg total) by mouth daily.  30 tablet 3   • venlafaxine 150 MG Oral Capsule SR 24 Hr Take 2 capsules (300 mg total) by mouth daily with breakfast. 60 capsule 3   • tamsulosin 20+ years ago. Perhaps, his CRPS symptoms have resolved. Given that he has no knee pain, and is doing well off of methadone, he would benefit from weaning other medications prescribed for this condition.   Especially, given the background of potential dem

## 2022-03-30 PROBLEM — R25.9 INVOLUNTARY MOVEMENTS: Status: ACTIVE | Noted: 2022-03-30

## 2022-03-30 PROBLEM — M62.838 MUSCLE SPASM OF BOTH LOWER LEGS: Status: ACTIVE | Noted: 2022-03-30

## 2022-09-28 ENCOUNTER — TELEPHONE (OUTPATIENT)
Dept: SURGERY | Facility: CLINIC | Age: 77
End: 2022-09-28

## 2023-04-28 ENCOUNTER — APPOINTMENT (OUTPATIENT)
Dept: ULTRASOUND IMAGING | Facility: HOSPITAL | Age: 78
End: 2023-04-28
Attending: INTERNAL MEDICINE
Payer: MEDICARE

## 2023-04-28 ENCOUNTER — APPOINTMENT (OUTPATIENT)
Dept: CT IMAGING | Facility: HOSPITAL | Age: 78
End: 2023-04-28
Attending: EMERGENCY MEDICINE
Payer: MEDICARE

## 2023-04-28 ENCOUNTER — APPOINTMENT (OUTPATIENT)
Dept: INTERVENTIONAL RADIOLOGY/VASCULAR | Facility: HOSPITAL | Age: 78
End: 2023-04-28
Attending: INTERNAL MEDICINE
Payer: MEDICARE

## 2023-04-28 ENCOUNTER — APPOINTMENT (OUTPATIENT)
Dept: GENERAL RADIOLOGY | Facility: HOSPITAL | Age: 78
End: 2023-04-28
Attending: EMERGENCY MEDICINE
Payer: MEDICARE

## 2023-04-28 ENCOUNTER — HOSPITAL ENCOUNTER (INPATIENT)
Facility: HOSPITAL | Age: 78
LOS: 3 days | Discharge: HOME | End: 2023-05-01
Attending: EMERGENCY MEDICINE | Admitting: INTERNAL MEDICINE
Payer: MEDICARE

## 2023-04-28 DIAGNOSIS — I26.99 OTHER ACUTE PULMONARY EMBOLISM, UNSPECIFIED WHETHER ACUTE COR PULMONALE PRESENT (HCC): Primary | ICD-10-CM

## 2023-04-28 LAB
ALBUMIN SERPL-MCNC: 3.2 G/DL (ref 3.4–5)
ALBUMIN/GLOB SERPL: 0.8 {RATIO} (ref 1–2)
ALP LIVER SERPL-CCNC: 147 U/L
ALT SERPL-CCNC: 25 U/L
ANION GAP SERPL CALC-SCNC: 8 MMOL/L (ref 0–18)
APTT PPP: 199 SECONDS (ref 23.3–35.6)
APTT PPP: 30.4 SECONDS (ref 23.3–35.6)
AST SERPL-CCNC: 18 U/L (ref 15–37)
ATRIAL RATE: 98 BPM
BASOPHILS # BLD AUTO: 0.05 X10(3) UL (ref 0–0.2)
BASOPHILS NFR BLD AUTO: 0.4 %
BILIRUB SERPL-MCNC: 0.5 MG/DL (ref 0.1–2)
BUN BLD-MCNC: 20 MG/DL (ref 7–18)
CALCIUM BLD-MCNC: 8.8 MG/DL (ref 8.5–10.1)
CHLORIDE SERPL-SCNC: 113 MMOL/L (ref 98–112)
CHOLEST SERPL-MCNC: 221 MG/DL (ref ?–200)
CO2 SERPL-SCNC: 20 MMOL/L (ref 21–32)
CREAT BLD-MCNC: 1.36 MG/DL
D DIMER PPP FEU-MCNC: 2.98 UG/ML FEU (ref ?–0.76)
EOSINOPHIL # BLD AUTO: 0.15 X10(3) UL (ref 0–0.7)
EOSINOPHIL NFR BLD AUTO: 1.3 %
ERYTHROCYTE [DISTWIDTH] IN BLOOD BY AUTOMATED COUNT: 13.7 %
ERYTHROCYTE [DISTWIDTH] IN BLOOD BY AUTOMATED COUNT: 13.9 %
FIBRINOGEN PPP-MCNC: 419 MG/DL (ref 180–480)
GFR SERPLBLD BASED ON 1.73 SQ M-ARVRAT: 54 ML/MIN/1.73M2 (ref 60–?)
GLOBULIN PLAS-MCNC: 3.8 G/DL (ref 2.8–4.4)
GLUCOSE BLD-MCNC: 250 MG/DL (ref 70–99)
HCT VFR BLD AUTO: 44.4 %
HCT VFR BLD AUTO: 44.7 %
HDLC SERPL-MCNC: 33 MG/DL (ref 40–59)
HGB BLD-MCNC: 14.8 G/DL
HGB BLD-MCNC: 15 G/DL
IMM GRANULOCYTES # BLD AUTO: 0.08 X10(3) UL (ref 0–1)
IMM GRANULOCYTES NFR BLD: 0.7 %
INR BLD: 1.05 (ref 0.85–1.16)
INR BLD: 1.19 (ref 0.85–1.16)
LDLC SERPL CALC-MCNC: 152 MG/DL (ref ?–100)
LYMPHOCYTES # BLD AUTO: 0.82 X10(3) UL (ref 1–4)
LYMPHOCYTES NFR BLD AUTO: 7 %
MCH RBC QN AUTO: 32.5 PG (ref 26–34)
MCH RBC QN AUTO: 32.9 PG (ref 26–34)
MCHC RBC AUTO-ENTMCNC: 33.3 G/DL (ref 31–37)
MCHC RBC AUTO-ENTMCNC: 33.6 G/DL (ref 31–37)
MCV RBC AUTO: 97 FL
MCV RBC AUTO: 98.7 FL
MONOCYTES # BLD AUTO: 0.54 X10(3) UL (ref 0.1–1)
MONOCYTES NFR BLD AUTO: 4.6 %
NEUTROPHILS # BLD AUTO: 10.01 X10 (3) UL (ref 1.5–7.7)
NEUTROPHILS # BLD AUTO: 10.01 X10(3) UL (ref 1.5–7.7)
NEUTROPHILS NFR BLD AUTO: 86 %
NONHDLC SERPL-MCNC: 188 MG/DL (ref ?–130)
NT-PROBNP SERPL-MCNC: 2405 PG/ML (ref ?–450)
OSMOLALITY SERPL CALC.SUM OF ELEC: 303 MOSM/KG (ref 275–295)
P AXIS: 6 DEGREES
P-R INTERVAL: 166 MS
PLATELET # BLD AUTO: 248 10(3)UL (ref 150–450)
PLATELET # BLD AUTO: 263 10(3)UL (ref 150–450)
POTASSIUM SERPL-SCNC: 3.8 MMOL/L (ref 3.5–5.1)
PROT SERPL-MCNC: 7 G/DL (ref 6.4–8.2)
PROTHROMBIN TIME: 13.8 SECONDS (ref 11.6–14.8)
PROTHROMBIN TIME: 15 SECONDS (ref 11.6–14.8)
Q-T INTERVAL: 358 MS
QRS DURATION: 86 MS
QTC CALCULATION (BEZET): 457 MS
R AXIS: 77 DEGREES
RBC # BLD AUTO: 4.5 X10(6)UL
RBC # BLD AUTO: 4.61 X10(6)UL
SARS-COV-2 RNA RESP QL NAA+PROBE: NOT DETECTED
SODIUM SERPL-SCNC: 141 MMOL/L (ref 136–145)
T AXIS: 94 DEGREES
TRIGL SERPL-MCNC: 195 MG/DL (ref 30–149)
TROPONIN I HIGH SENSITIVITY: 131 NG/L
TROPONIN I HIGH SENSITIVITY: 300 NG/L
VENTRICULAR RATE: 98 BPM
VLDLC SERPL CALC-MCNC: 38 MG/DL (ref 0–30)
WBC # BLD AUTO: 11.7 X10(3) UL (ref 4–11)
WBC # BLD AUTO: 12.1 X10(3) UL (ref 4–11)

## 2023-04-28 PROCEDURE — 93005 ELECTROCARDIOGRAM TRACING: CPT

## 2023-04-28 PROCEDURE — 99153 MOD SED SAME PHYS/QHP EA: CPT | Performed by: INTERNAL MEDICINE

## 2023-04-28 PROCEDURE — 93970 EXTREMITY STUDY: CPT | Performed by: INTERNAL MEDICINE

## 2023-04-28 PROCEDURE — 96365 THER/PROPH/DIAG IV INF INIT: CPT

## 2023-04-28 PROCEDURE — 70450 CT HEAD/BRAIN W/O DYE: CPT | Performed by: EMERGENCY MEDICINE

## 2023-04-28 PROCEDURE — 93451 RIGHT HEART CATH: CPT | Performed by: INTERNAL MEDICINE

## 2023-04-28 PROCEDURE — 37211 THROMBOLYTIC ART THERAPY: CPT | Performed by: INTERNAL MEDICINE

## 2023-04-28 PROCEDURE — 4A023N6 MEASUREMENT OF CARDIAC SAMPLING AND PRESSURE, RIGHT HEART, PERCUTANEOUS APPROACH: ICD-10-PCS | Performed by: INTERNAL MEDICINE

## 2023-04-28 PROCEDURE — 85027 COMPLETE CBC AUTOMATED: CPT | Performed by: INTERNAL MEDICINE

## 2023-04-28 PROCEDURE — 85379 FIBRIN DEGRADATION QUANT: CPT | Performed by: EMERGENCY MEDICINE

## 2023-04-28 PROCEDURE — 80053 COMPREHEN METABOLIC PANEL: CPT | Performed by: EMERGENCY MEDICINE

## 2023-04-28 PROCEDURE — 02FR3Z0 FRAGMENTATION OF LEFT PULMONARY ARTERY, PERCUTANEOUS APPROACH, ULTRASONIC: ICD-10-PCS | Performed by: INTERNAL MEDICINE

## 2023-04-28 PROCEDURE — 85730 THROMBOPLASTIN TIME PARTIAL: CPT | Performed by: EMERGENCY MEDICINE

## 2023-04-28 PROCEDURE — 85610 PROTHROMBIN TIME: CPT | Performed by: INTERNAL MEDICINE

## 2023-04-28 PROCEDURE — 85384 FIBRINOGEN ACTIVITY: CPT | Performed by: INTERNAL MEDICINE

## 2023-04-28 PROCEDURE — 99291 CRITICAL CARE FIRST HOUR: CPT

## 2023-04-28 PROCEDURE — 99152 MOD SED SAME PHYS/QHP 5/>YRS: CPT | Performed by: INTERNAL MEDICINE

## 2023-04-28 PROCEDURE — 80061 LIPID PANEL: CPT | Performed by: EMERGENCY MEDICINE

## 2023-04-28 PROCEDURE — 85610 PROTHROMBIN TIME: CPT | Performed by: EMERGENCY MEDICINE

## 2023-04-28 PROCEDURE — 75743 ARTERY X-RAYS LUNGS: CPT | Performed by: INTERNAL MEDICINE

## 2023-04-28 PROCEDURE — 84484 ASSAY OF TROPONIN QUANT: CPT | Performed by: EMERGENCY MEDICINE

## 2023-04-28 PROCEDURE — 02FQ3Z0 FRAGMENTATION OF RIGHT PULMONARY ARTERY, PERCUTANEOUS APPROACH, ULTRASONIC: ICD-10-PCS | Performed by: INTERNAL MEDICINE

## 2023-04-28 PROCEDURE — 85025 COMPLETE CBC W/AUTO DIFF WBC: CPT | Performed by: EMERGENCY MEDICINE

## 2023-04-28 PROCEDURE — 36014 PLACE CATHETER IN ARTERY: CPT | Performed by: INTERNAL MEDICINE

## 2023-04-28 PROCEDURE — 71275 CT ANGIOGRAPHY CHEST: CPT | Performed by: EMERGENCY MEDICINE

## 2023-04-28 PROCEDURE — 93010 ELECTROCARDIOGRAM REPORT: CPT

## 2023-04-28 PROCEDURE — 85730 THROMBOPLASTIN TIME PARTIAL: CPT | Performed by: INTERNAL MEDICINE

## 2023-04-28 PROCEDURE — 71045 X-RAY EXAM CHEST 1 VIEW: CPT | Performed by: EMERGENCY MEDICINE

## 2023-04-28 PROCEDURE — 3E06317 INTRODUCTION OF OTHER THROMBOLYTIC INTO CENTRAL ARTERY, PERCUTANEOUS APPROACH: ICD-10-PCS | Performed by: INTERNAL MEDICINE

## 2023-04-28 PROCEDURE — 83880 ASSAY OF NATRIURETIC PEPTIDE: CPT | Performed by: EMERGENCY MEDICINE

## 2023-04-28 RX ORDER — MORPHINE SULFATE 4 MG/ML
4 INJECTION, SOLUTION INTRAMUSCULAR; INTRAVENOUS EVERY 2 HOUR PRN
Status: DISCONTINUED | OUTPATIENT
Start: 2023-04-28 | End: 2023-05-01

## 2023-04-28 RX ORDER — MIDAZOLAM HYDROCHLORIDE 1 MG/ML
INJECTION INTRAMUSCULAR; INTRAVENOUS
Status: COMPLETED
Start: 2023-04-28 | End: 2023-04-28

## 2023-04-28 RX ORDER — HEPARIN SODIUM AND DEXTROSE 10000; 5 [USP'U]/100ML; G/100ML
INJECTION INTRAVENOUS CONTINUOUS
Status: CANCELLED | OUTPATIENT
Start: 2023-04-28

## 2023-04-28 RX ORDER — HEPARIN SODIUM 5000 [USP'U]/ML
INJECTION, SOLUTION INTRAVENOUS; SUBCUTANEOUS
Status: DISCONTINUED
Start: 2023-04-28 | End: 2023-04-28 | Stop reason: WASHOUT

## 2023-04-28 RX ORDER — ACETAMINOPHEN AND CODEINE PHOSPHATE 300; 30 MG/1; MG/1
1 TABLET ORAL EVERY 4 HOURS PRN
Status: DISCONTINUED | OUTPATIENT
Start: 2023-04-28 | End: 2023-05-01

## 2023-04-28 RX ORDER — HEPARIN SODIUM 1000 [USP'U]/ML
80 INJECTION, SOLUTION INTRAVENOUS; SUBCUTANEOUS ONCE
Status: COMPLETED | OUTPATIENT
Start: 2023-04-28 | End: 2023-04-28

## 2023-04-28 RX ORDER — HEPARIN SODIUM 5000 [USP'U]/ML
INJECTION, SOLUTION INTRAVENOUS; SUBCUTANEOUS
Status: COMPLETED
Start: 2023-04-28 | End: 2023-04-28

## 2023-04-28 RX ORDER — SODIUM PHOSPHATE, DIBASIC AND SODIUM PHOSPHATE, MONOBASIC 7; 19 G/133ML; G/133ML
1 ENEMA RECTAL ONCE AS NEEDED
Status: DISCONTINUED | OUTPATIENT
Start: 2023-04-28 | End: 2023-05-01

## 2023-04-28 RX ORDER — LIDOCAINE HYDROCHLORIDE 10 MG/ML
INJECTION, SOLUTION EPIDURAL; INFILTRATION; INTRACAUDAL; PERINEURAL
Status: COMPLETED
Start: 2023-04-28 | End: 2023-04-28

## 2023-04-28 RX ORDER — MIRTAZAPINE 15 MG/1
15 TABLET, FILM COATED ORAL NIGHTLY
Status: DISCONTINUED | OUTPATIENT
Start: 2023-04-28 | End: 2023-05-01

## 2023-04-28 RX ORDER — MELATONIN
3 NIGHTLY PRN
Status: DISCONTINUED | OUTPATIENT
Start: 2023-04-28 | End: 2023-05-01

## 2023-04-28 RX ORDER — ONDANSETRON 2 MG/ML
4 INJECTION INTRAMUSCULAR; INTRAVENOUS EVERY 6 HOURS PRN
Status: DISCONTINUED | OUTPATIENT
Start: 2023-04-28 | End: 2023-05-01

## 2023-04-28 RX ORDER — GABAPENTIN 300 MG/1
300 CAPSULE ORAL 2 TIMES DAILY
Status: DISCONTINUED | OUTPATIENT
Start: 2023-04-28 | End: 2023-05-01

## 2023-04-28 RX ORDER — MORPHINE SULFATE 2 MG/ML
2 INJECTION, SOLUTION INTRAMUSCULAR; INTRAVENOUS EVERY 2 HOUR PRN
Status: DISCONTINUED | OUTPATIENT
Start: 2023-04-28 | End: 2023-05-01

## 2023-04-28 RX ORDER — POLYETHYLENE GLYCOL 3350 17 G/17G
17 POWDER, FOR SOLUTION ORAL DAILY PRN
Status: DISCONTINUED | OUTPATIENT
Start: 2023-04-28 | End: 2023-05-01

## 2023-04-28 RX ORDER — ACETAMINOPHEN AND CODEINE PHOSPHATE 300; 30 MG/1; MG/1
2 TABLET ORAL EVERY 4 HOURS PRN
Status: DISCONTINUED | OUTPATIENT
Start: 2023-04-28 | End: 2023-05-01

## 2023-04-28 RX ORDER — SODIUM CHLORIDE 9 MG/ML
INJECTION, SOLUTION INTRAVENOUS CONTINUOUS
Status: DISCONTINUED | OUTPATIENT
Start: 2023-04-28 | End: 2023-04-29

## 2023-04-28 RX ORDER — SENNOSIDES 8.6 MG
17.2 TABLET ORAL NIGHTLY PRN
Status: DISCONTINUED | OUTPATIENT
Start: 2023-04-28 | End: 2023-05-01

## 2023-04-28 RX ORDER — IODIXANOL 320 MG/ML
100 INJECTION, SOLUTION INTRAVASCULAR
Status: COMPLETED | OUTPATIENT
Start: 2023-04-28 | End: 2023-04-28

## 2023-04-28 RX ORDER — SODIUM CHLORIDE 9 MG/ML
INJECTION, SOLUTION INTRAVENOUS
Status: CANCELLED | OUTPATIENT
Start: 2023-04-29 | End: 2023-04-29

## 2023-04-28 RX ORDER — OLANZAPINE 15 MG/1
15 TABLET ORAL NIGHTLY
Status: DISCONTINUED | OUTPATIENT
Start: 2023-04-28 | End: 2023-05-01

## 2023-04-28 RX ORDER — MORPHINE SULFATE 2 MG/ML
1 INJECTION, SOLUTION INTRAMUSCULAR; INTRAVENOUS EVERY 2 HOUR PRN
Status: DISCONTINUED | OUTPATIENT
Start: 2023-04-28 | End: 2023-05-01

## 2023-04-28 RX ORDER — WATER 1000 ML/1000ML
INJECTION, SOLUTION INTRAVENOUS
Status: COMPLETED
Start: 2023-04-28 | End: 2023-04-28

## 2023-04-28 RX ORDER — BISACODYL 10 MG
10 SUPPOSITORY, RECTAL RECTAL
Status: DISCONTINUED | OUTPATIENT
Start: 2023-04-28 | End: 2023-05-01

## 2023-04-28 RX ORDER — HEPARIN SODIUM AND DEXTROSE 10000; 5 [USP'U]/100ML; G/100ML
18 INJECTION INTRAVENOUS ONCE
Status: COMPLETED | OUTPATIENT
Start: 2023-04-28 | End: 2023-04-28

## 2023-04-28 RX ORDER — VENLAFAXINE HYDROCHLORIDE 75 MG/1
300 CAPSULE, EXTENDED RELEASE ORAL
Status: DISCONTINUED | OUTPATIENT
Start: 2023-04-29 | End: 2023-05-01

## 2023-04-28 RX ORDER — ACETAMINOPHEN 325 MG/1
650 TABLET ORAL EVERY 4 HOURS PRN
Status: DISCONTINUED | OUTPATIENT
Start: 2023-04-28 | End: 2023-05-01

## 2023-04-28 NOTE — ED INITIAL ASSESSMENT (HPI)
Arrives via EMS from home with c/o SOB increased with exertion. Mild chest pain upon arrival to home per EMS. RA sats 85%, placed on NRB.  Denies CP at this time

## 2023-04-28 NOTE — ED QUICK NOTES
Cath lab team arrived, preparing patient for transfer to cath lab. Pt's wife and family informed on plan of care.

## 2023-04-29 ENCOUNTER — APPOINTMENT (OUTPATIENT)
Dept: CV DIAGNOSTICS | Facility: HOSPITAL | Age: 78
End: 2023-04-29
Attending: EMERGENCY MEDICINE
Payer: MEDICARE

## 2023-04-29 LAB
ANION GAP SERPL CALC-SCNC: 6 MMOL/L (ref 0–18)
APTT PPP: 29.9 SECONDS (ref 23.3–35.6)
APTT PPP: 31.6 SECONDS (ref 23.3–35.6)
APTT PPP: 34.6 SECONDS (ref 23.3–35.6)
BASOPHILS # BLD AUTO: 0.07 X10(3) UL (ref 0–0.2)
BASOPHILS # BLD AUTO: 0.07 X10(3) UL (ref 0–0.2)
BASOPHILS NFR BLD AUTO: 0.6 %
BASOPHILS NFR BLD AUTO: 0.6 %
BUN BLD-MCNC: 17 MG/DL (ref 7–18)
CALCIUM BLD-MCNC: 8.1 MG/DL (ref 8.5–10.1)
CHLORIDE SERPL-SCNC: 115 MMOL/L (ref 98–112)
CO2 SERPL-SCNC: 23 MMOL/L (ref 21–32)
CREAT BLD-MCNC: 0.99 MG/DL
EOSINOPHIL # BLD AUTO: 0.18 X10(3) UL (ref 0–0.7)
EOSINOPHIL # BLD AUTO: 0.19 X10(3) UL (ref 0–0.7)
EOSINOPHIL NFR BLD AUTO: 1.4 %
EOSINOPHIL NFR BLD AUTO: 1.6 %
ERYTHROCYTE [DISTWIDTH] IN BLOOD BY AUTOMATED COUNT: 13.9 %
ERYTHROCYTE [DISTWIDTH] IN BLOOD BY AUTOMATED COUNT: 14 %
ERYTHROCYTE [DISTWIDTH] IN BLOOD BY AUTOMATED COUNT: 14 %
ERYTHROCYTE [DISTWIDTH] IN BLOOD BY AUTOMATED COUNT: 14.1 %
FIBRINOGEN PPP-MCNC: 212 MG/DL (ref 180–480)
FIBRINOGEN PPP-MCNC: 368 MG/DL (ref 180–480)
FIBRINOGEN PPP-MCNC: 388 MG/DL (ref 180–480)
GFR SERPLBLD BASED ON 1.73 SQ M-ARVRAT: 79 ML/MIN/1.73M2 (ref 60–?)
GLUCOSE BLD-MCNC: 142 MG/DL (ref 70–99)
HCT VFR BLD AUTO: 39.8 %
HCT VFR BLD AUTO: 40.6 %
HCT VFR BLD AUTO: 41 %
HCT VFR BLD AUTO: 41 %
HGB BLD-MCNC: 13.1 G/DL
HGB BLD-MCNC: 13.4 G/DL
HGB BLD-MCNC: 13.4 G/DL
HGB BLD-MCNC: 13.5 G/DL
IMM GRANULOCYTES # BLD AUTO: 0.06 X10(3) UL (ref 0–1)
IMM GRANULOCYTES # BLD AUTO: 0.08 X10(3) UL (ref 0–1)
IMM GRANULOCYTES NFR BLD: 0.5 %
IMM GRANULOCYTES NFR BLD: 0.7 %
INR BLD: 1.07 (ref 0.85–1.16)
INR BLD: 1.14 (ref 0.85–1.16)
INR BLD: 1.5 (ref 0.85–1.16)
LYMPHOCYTES # BLD AUTO: 1.26 X10(3) UL (ref 1–4)
LYMPHOCYTES # BLD AUTO: 1.72 X10(3) UL (ref 1–4)
LYMPHOCYTES NFR BLD AUTO: 10.7 %
LYMPHOCYTES NFR BLD AUTO: 13.8 %
MCH RBC QN AUTO: 32.8 PG (ref 26–34)
MCH RBC QN AUTO: 33.4 PG (ref 26–34)
MCH RBC QN AUTO: 33.4 PG (ref 26–34)
MCH RBC QN AUTO: 33.5 PG (ref 26–34)
MCHC RBC AUTO-ENTMCNC: 32.7 G/DL (ref 31–37)
MCHC RBC AUTO-ENTMCNC: 32.7 G/DL (ref 31–37)
MCHC RBC AUTO-ENTMCNC: 32.9 G/DL (ref 31–37)
MCHC RBC AUTO-ENTMCNC: 33.3 G/DL (ref 31–37)
MCV RBC AUTO: 101.8 FL
MCV RBC AUTO: 102.2 FL
MCV RBC AUTO: 102.2 FL
MCV RBC AUTO: 98.5 FL
MONOCYTES # BLD AUTO: 1.06 X10(3) UL (ref 0.1–1)
MONOCYTES # BLD AUTO: 1.21 X10(3) UL (ref 0.1–1)
MONOCYTES NFR BLD AUTO: 9 %
MONOCYTES NFR BLD AUTO: 9.7 %
NEUTROPHILS # BLD AUTO: 9.1 X10 (3) UL (ref 1.5–7.7)
NEUTROPHILS # BLD AUTO: 9.1 X10(3) UL (ref 1.5–7.7)
NEUTROPHILS # BLD AUTO: 9.19 X10 (3) UL (ref 1.5–7.7)
NEUTROPHILS # BLD AUTO: 9.19 X10(3) UL (ref 1.5–7.7)
NEUTROPHILS NFR BLD AUTO: 74 %
NEUTROPHILS NFR BLD AUTO: 77.4 %
OSMOLALITY SERPL CALC.SUM OF ELEC: 302 MOSM/KG (ref 275–295)
PLATELET # BLD AUTO: 195 10(3)UL (ref 150–450)
PLATELET # BLD AUTO: 198 10(3)UL (ref 150–450)
PLATELET # BLD AUTO: 198 10(3)UL (ref 150–450)
PLATELET # BLD AUTO: 218 10(3)UL (ref 150–450)
POTASSIUM SERPL-SCNC: 3.9 MMOL/L (ref 3.5–5.1)
PROTHROMBIN TIME: 13.9 SECONDS (ref 11.6–14.8)
PROTHROMBIN TIME: 14.6 SECONDS (ref 11.6–14.8)
PROTHROMBIN TIME: 18.1 SECONDS (ref 11.6–14.8)
RBC # BLD AUTO: 3.91 X10(6)UL
RBC # BLD AUTO: 4.01 X10(6)UL
RBC # BLD AUTO: 4.01 X10(6)UL
RBC # BLD AUTO: 4.12 X10(6)UL
SODIUM SERPL-SCNC: 144 MMOL/L (ref 136–145)
WBC # BLD AUTO: 11.8 X10(3) UL (ref 4–11)
WBC # BLD AUTO: 11.8 X10(3) UL (ref 4–11)
WBC # BLD AUTO: 12.2 X10(3) UL (ref 4–11)
WBC # BLD AUTO: 12.4 X10(3) UL (ref 4–11)

## 2023-04-29 PROCEDURE — 93306 TTE W/DOPPLER COMPLETE: CPT | Performed by: EMERGENCY MEDICINE

## 2023-04-29 PROCEDURE — 85027 COMPLETE CBC AUTOMATED: CPT | Performed by: INTERNAL MEDICINE

## 2023-04-29 PROCEDURE — 85730 THROMBOPLASTIN TIME PARTIAL: CPT | Performed by: INTERNAL MEDICINE

## 2023-04-29 PROCEDURE — 85025 COMPLETE CBC W/AUTO DIFF WBC: CPT | Performed by: INTERNAL MEDICINE

## 2023-04-29 PROCEDURE — 85018 HEMOGLOBIN: CPT | Performed by: INTERNAL MEDICINE

## 2023-04-29 PROCEDURE — 80048 BASIC METABOLIC PNL TOTAL CA: CPT | Performed by: INTERNAL MEDICINE

## 2023-04-29 PROCEDURE — 85384 FIBRINOGEN ACTIVITY: CPT | Performed by: INTERNAL MEDICINE

## 2023-04-29 PROCEDURE — 85610 PROTHROMBIN TIME: CPT | Performed by: INTERNAL MEDICINE

## 2023-04-29 RX ORDER — OLANZAPINE 5 MG/1
5 TABLET ORAL EVERY MORNING
Status: DISCONTINUED | OUTPATIENT
Start: 2023-04-29 | End: 2023-05-01

## 2023-04-29 NOTE — OCCUPATIONAL THERAPY NOTE
Attempted to see pt for OT. Pt with B PEs. Per RN, pt with ekkos cath and not able to get up until tonight. Will re-attempt to see pt as appropriate and as able. I would be OK with her flying only if she uses 6L of oxygen when she flies. Her body needs more oxygen when she flies.

## 2023-04-29 NOTE — PROCEDURES
[address] [facility] Location: Cath Lab    CSN [id number] MRN [id number]   Admission Date 4/28/2023 Procedure Date 4/28/2023   Attending Physician No att. providers found Procedure Physician J Luis Parker MD         CARDIAC CATHETERIZATION REPORT     PREOPERATIVE DIAGNOSIS:  pulmonary embolism  POSTOPERATIVE DIAGNOSIS:  same as above. PROCEDURE PERFORMED:  catheter-directed thrombolytics (EKOS)      PROCEDURE:  The patient was brought to the cardiac catheterization lab in the fasting state. Informed consent was obtained. Moderate sedation was employed using a total of IV Versed 1mg and IV fentanyl 25mcg. I directly observed the patient from [phone] to 1900, for a total of 29 minutes, and an independent trained observer was present and assisted in the monitoring of the patient's level of consciousness and physiological status, watching the heart rate, blood pressure, oximetry, and rhythm, in addition to total moderation time. Right femoral vein micropuncture access was achieved by ultrasound guidance and two 7F sheaths were placed. A wedge catheter was advanced to the left pulmonary artery and pulmonary angiogram was performed. Pulmonary artery and wedge pressure were measured, PA saturation drawn. The catheter was exchanged for an 0.035 J wire and the wedge catheter was re-introduced into the other sheath, advanced to the right pulmonary artery. Repeat pulmonary angiography was performed. Again, the catheter was exchanged for another 0.035 J wire. Next, the wires were exchanged for the EKOS delivery sheaths, through which a bolus of 5mg of tPA were administered to each pulmonary artery. Two 12cm EKOS catheter were placed in the right and left pulmonary artery, respectively; coolant and heparin were administered through the sheath and the catheter was attached to the console. The sheaths were secured to the skin, the procedure was tolerated well.      HEMODYNAMICS  - pulmonary artery: 58/20/34 mmHg  - pulmonary capillary wedge pressure: 24/25/19 mmHg  - PA saturation: 50.0% Hg 15.6 g/dL  - Aorta saturation: 92% on 6L O2  - Cardiac output/cardiac index: 4.0 L/min, 2.0L/min/kg    MEDICATIONS:  See nursing record. COMPLICATIONS:  No major complications were observed during this visit to the catheterization lab. CONCLUSION:   1. right heart catheterization: moderate pulmonary hypertension, elevated pulmonary capillary wedge pressure, decreased cardiac output/index  2. pulmonary angiography: no saddle or large thrombus burden in the proximal pulmonary arteries  4. Successful catheter directed thrombolytic delivery of 5mg tPA to bilateral pulmonary arteries, followed by placement of bilateral EKOS catheters. RECOMMENDATIONS: continued 18 hr infusion of tPA at 0.5mg/hr per pulmonary artery.

## 2023-04-29 NOTE — PHYSICAL THERAPY NOTE
Attempted to see pt for PT Pt with B PEs. Per RN, pt with ekkos cath and not able to get up until tonight. Will re-attempt to see pt as appropriate and as able.

## 2023-04-29 NOTE — PROGRESS NOTES
Assumed care of pt at approximately 1950 when admitted to CCU. EKOS in R groin, site is C/D/I, heparin and alteplase infusing. R pedal pulse is palpable, +1, bilateral feet are decreased in sensation and cool to touch, however pt states this is normal for him due to his neuropathy. Bilateral feet became warmer to touch as night progressed. Labs done Q4 as ordered. Notified Dr. Mauro Chowdhury about drop in fibrinogen, no response given. Wife at bedside, updated on POC and assisted in pt care. Will continue to monitor.

## 2023-04-30 PROCEDURE — 97165 OT EVAL LOW COMPLEX 30 MIN: CPT

## 2023-04-30 PROCEDURE — 97535 SELF CARE MNGMENT TRAINING: CPT

## 2023-04-30 PROCEDURE — 97161 PT EVAL LOW COMPLEX 20 MIN: CPT

## 2023-04-30 PROCEDURE — 97116 GAIT TRAINING THERAPY: CPT

## 2023-04-30 NOTE — PLAN OF CARE
EKOS removed per order at 1200, venous sheaths removed at 1300 per order of Dr Renita Santana. Manual hold, hemostasis achieved. Groin remains soft without hematoma, pulses palpable. Up to chair for dinner. Denies SOB, CP. Weaned to RA. SBP stable. ECHO done at bedside. Family at bedside throughout day.

## 2023-04-30 NOTE — PLAN OF CARE
Up in chair, ambulating halls. SR with PACs-occasionally going into AFIB. Dr Renita Santana aware. Remains on xarelto. Groin site soft without hematoma. Transfer orders received.

## 2023-04-30 NOTE — PROGRESS NOTES
Assumed care of pt at approximately 1930. NSR on monitor. On 2L NC. Groin site remained C/D/I, pulses remain palpable. No c/o pain. Up to chair in morning.

## 2023-05-01 VITALS
WEIGHT: 306.81 LBS | OXYGEN SATURATION: 94 % | TEMPERATURE: 98 F | HEART RATE: 76 BPM | HEIGHT: 73 IN | DIASTOLIC BLOOD PRESSURE: 68 MMHG | BODY MASS INDEX: 40.66 KG/M2 | RESPIRATION RATE: 12 BRPM | SYSTOLIC BLOOD PRESSURE: 138 MMHG

## 2023-05-01 PROCEDURE — 99152 MOD SED SAME PHYS/QHP 5/>YRS: CPT | Performed by: INTERNAL MEDICINE

## 2023-05-01 PROCEDURE — 37211 THROMBOLYTIC ART THERAPY: CPT | Performed by: INTERNAL MEDICINE

## 2023-05-01 PROCEDURE — 99153 MOD SED SAME PHYS/QHP EA: CPT | Performed by: INTERNAL MEDICINE

## 2023-05-01 PROCEDURE — 93451 RIGHT HEART CATH: CPT | Performed by: INTERNAL MEDICINE

## 2023-05-01 PROCEDURE — 75743 ARTERY X-RAYS LUNGS: CPT | Performed by: INTERNAL MEDICINE

## 2023-05-01 PROCEDURE — 36014 PLACE CATHETER IN ARTERY: CPT | Performed by: INTERNAL MEDICINE

## 2023-05-01 RX ORDER — OLANZAPINE 5 MG/1
2.5 TABLET ORAL EVERY MORNING
Qty: 45 TABLET | Refills: 1 | Status: SHIPPED | COMMUNITY
Start: 2023-05-01

## 2023-05-01 NOTE — PLAN OF CARE
Problem: Patient/Family Goals  Goal: Patient/Family Long Term Goal  Description: Patient's Long Term Goal: to go home    Interventions:  - ambulating in hallways  - See additional Care Plan goals for specific interventions  Outcome: Adequate for Discharge  Goal: Patient/Family Short Term Goal  Description: Patient's Short Term Goal: manage pain    Interventions:   - PRN pain medication available  - See additional Care Plan goals for specific interventions  Outcome: Adequate for Discharge     Problem: HEMATOLOGIC - ADULT  Goal: Free from bleeding injury  Description: (Example usage: patient with low platelets)  INTERVENTIONS:  - Avoid intramuscular injections, enemas and rectal medication administration  - Ensure safe mobilization of patient  - Hold pressure on venipuncture sites to achieve adequate hemostasis  - Assess for signs and symptoms of internal bleeding  - Monitor lab trends  - Patient is to report abnormal signs of bleeding to staff  - Avoid use of toothpicks and dental floss  - Use electric shaver for shaving  - Use soft bristle tooth brush  - Limit straining and forceful nose blowing  Outcome: Adequate for Discharge

## 2023-05-01 NOTE — DISCHARGE INSTRUCTIONS
Follow up with Dr. Bianca Briones on Tuesday 5/9/23 1220PM.  [place] [id number]. [phone].

## 2023-05-01 NOTE — PLAN OF CARE
Assumed care of the pt at 0730, assessment per flowsheet, up and about in room and hallway without difficulty. No c/o pain, groin c/d/i. IV's removed for discharge, duly hosp and Dr. Rody Floyd to bedside, with discharge instructions and orders. Navigator completed, wife at bedside, all instructions gone over with pt and wife Davian Parker. No questions at this time. Pt missing shoes after getting dressed, no where in room, called security and spoke to Rogelio Erazo who stated they did not have blue [employer] gym shoes. Called and spoke to [facility] in cath lab who stated they were not in the room he was in and spoke to [facility] in ED who stated there was none at th charge desk. Called back Rogelio Erazo in security to let him know we had checked all the other areas and if he could call our unit back if they found them. Charge nurse in unit notfied as well. Gave pt wife a card with our number to follow up and told her we would call her if we found them. Sent home with grippy socks on.       Problem: Patient/Family Goals  Goal: Patient/Family Long Term Goal  Description: Patient's Long Term Goal: to go home    Interventions:  - ambulating in hallways  - See additional Care Plan goals for specific interventions  5/1/2023 1117 by Med Torres RN  Outcome: Adequate for Discharge  5/1/2023 0836 by Med Torres RN  Outcome: Adequate for Discharge  Goal: Patient/Family Short Term Goal  Description: Patient's Short Term Goal: manage pain    Interventions:   - PRN pain medication available  - See additional Care Plan goals for specific interventions  5/1/2023 1117 by Med Torres RN  Outcome: Adequate for Discharge  5/1/2023 0836 by Med Torres RN  Outcome: Adequate for Discharge     Problem: HEMATOLOGIC - ADULT  Goal: Free from bleeding injury  Description: (Example usage: patient with low platelets)  INTERVENTIONS:  - Avoid intramuscular injections, enemas and rectal medication administration  - Ensure safe mobilization of patient  - Hold pressure on venipuncture sites to achieve adequate hemostasis  - Assess for signs and symptoms of internal bleeding  - Monitor lab trends  - Patient is to report abnormal signs of bleeding to staff  - Avoid use of toothpicks and dental floss  - Use electric shaver for shaving  - Use soft bristle tooth brush  - Limit straining and forceful nose blowing  5/1/2023 1117 by Kiana Cabral RN  Outcome: Adequate for Discharge  5/1/2023 0836 by Kiana Cabral RN  Outcome: Adequate for Discharge

## 2023-05-01 NOTE — PLAN OF CARE
Assumed care of pt at approximately 1930. VSS, NSR on tele. Ambulates to bathroom with steady gait and no assistive devices. Groin site is C/D/I. No c/o pain. Up to chair in morning.

## 2024-12-03 NOTE — H&P
HPI:     Travis Morrissey III is a 79 year old male with a PMH of obesity, HTN, neuropathy, thrombophilia (on xarelto), b/l LE edema.  He presents as a consult with:  1. Low risk CaP on AS  - dx in 2014 in [place]  2. BPH/LUTS  - on flomax in the past  3. Fam h/o CaP  - dad dx in 60s    PCP - [place]  Prior Urologist - Iveth 11/8/23    Presents to establish care with me as Dr MATHEW is too far away.  He feels well. Appetite and energy are good.     He was on flomax in the past and stopped bc he didn't need to take it anymore.    AUA SS is 2/35 with 1 n, w. Mostly happy with LUTS.  Incontinence: none  Pt declines penoscrotal exam and MARK but open to this if PSA rises    Unable to void. Will try to provide UA at NOV.    UTI hx: none  Gross hematuria: none  Tobacco hx: 15 pack years, quit ~ 1980  Kidney stone hx: none  Fam h/o  malignancy: none    Poor potency and prefers observation.    Prior PSAs:  - 5.64 4/24/24  - 6.09 11/2/23  - 7.20 4/21/23  - 5.04 2/15/22  - 5.37 3/19/21  - 4.32 1/16/20  - 3.04 11/29/18  - 3.23 11/28/17  - 2.01 9/14/15    CT AP + IVC 7/28/21: no  abnormalities, sliding hiatal hernia, FLD, constipation, gynecomastia    Drinks ~ 20-30 oz water, < 12 oz coffee with medium yellow urine urine. I encouraged the pt drink at least 40-60 oz water per day or enough to keep urine clear to pale yellow for UTI prevention.    Discussed options for low risk CaP and he wants to continue active surveillance. Will check PSA today and he'd prefer another check-up in May. If PSA is stable he'd prefer annual checks. Observation for BPH/LUTS, ED. Increase water intake for UTI prevention.    HISTORY:  Past Medical History:    Anesthesia complication    BPH (benign prostatic hyperplasia)    Cancer (HCC)    prostate cancer, PSA tracking    Neuropathy    right arm    Reflex sympathetic dystrophy    s/p TKR?      Past Surgical History:   Procedure Laterality Date    Colonoscopy N/A 12/2/2014    [facility].   Hemorrhoids.  Repeat PRN    Other surgical history Right 2000    knee replacement    Other surgical history Left unknown    orthoscopic    Skin surgery Right 02/27/2019    BCC Right Nasal Ala MMS by MM    Tonsillectomy      Total knee replacement Right 2000    Upper gi endoscopy,biopsy  12/2/14= Normal, SB Bx normal      Family History   Problem Relation Age of Onset    No Known Problems Father     Stroke Mother       Social History:   Social History     Socioeconomic History    Marital status:     Number of children: 1   Occupational History    Occupation: retired      Comment:    Tobacco Use    Smoking status: Former     Current packs/day: 1.00     Average packs/day: 1 pack/day for 20.0 years (20.0 ttl pk-yrs)     Types: Cigarettes, Cigars    Smokeless tobacco: Never    Tobacco comments:     quit smoking cigars in 2013 quit cigarrettes in 1970\"s   Vaping Use    Vaping status: Never Used   Substance and Sexual Activity    Alcohol use: No    Drug use: No   Other Topics Concern     Service No    Blood Transfusions No    Caffeine Concern Yes    Exercise No    Seat Belt Yes   Social History Narrative    Lives with wife    1 daughter            Medications (Active prior to today's visit):  Current Outpatient Medications   Medication Sig Dispense Refill    XARELTO 20 MG Oral Tab Take 1 tablet (20 mg total) by mouth daily with food.      rosuvastatin 10 MG Oral Tab Take 1 tablet (10 mg total) by mouth nightly.      gabapentin 300 MG Oral Cap Take 1 capsule (300 mg total) by mouth in the morning and 1 capsule (300 mg total) before bedtime. 180 capsule 1    mirtazapine 15 MG Oral Tab Take 1 tablet (15 mg total) by mouth nightly. 90 tablet 1    OLANZapine 15 MG Oral Tab Take 1 tablet (15 mg total) by mouth nightly. 90 tablet 1    OLANZapine 5 MG Oral Tab Take 0.5 tablets (2.5 mg total) by mouth every morning. 135 tablet 1    venlafaxine  MG Oral Capsule SR 24 Hr Take  2 capsules (300 mg total) by mouth daily with breakfast. 180 capsule 1    Betamethasone Dipropionate 0.05 % External Ointment Apply to AA of legs BID for up to 2 weeks, then hold x 2 weeks, repeat as needed. 50 g 1    baclofen 10 MG Oral Tab Take 1 tablet (10 mg total) by mouth at bedtime. 90 tablet 0       Allergies:  Allergies[1]      ROS:     A comprehensive 10 point review of systems was completed.  Pertinent positives and negatives noted in the the HPI.    PHYSICAL EXAM:     GENERAL APPEARANCE: well, developed, well nourished, in no acute distress  NEUROLOGIC: nonfocal, alert and oriented  HEAD: normocephalic, atraumatic  EYES: sclera non-icteric  EARS: hearing intact  ORAL CAVITY: mucosa moist  NECK/THYROID: no obvious goiter or masses  LUNGS: nonlabored breathing  ABDOMEN: soft, no obvious masses or tenderness  SKIN: no obvious rashes    : as noted above    ASSESSMENT/PLAN:   Diagnoses and all orders for this visit:    Prostate cancer (HCC)  -     PSA Total, Diagnostic; Future  -     PSA Total, Diagnostic; Future    Elevated PSA    BPH with obstruction/lower urinary tract symptoms    Erectile dysfunction, unspecified erectile dysfunction type    - as noted above.    Thanks again for this consult.    Angel Monreal MD, FACS  Urologist  [facility]  Office: [phone]              [1] No Known Allergies

## 2024-12-11 ENCOUNTER — LAB ENCOUNTER (OUTPATIENT)
Dept: LAB | Age: 79
End: 2024-12-11
Attending: UROLOGY
Payer: MEDICARE

## 2024-12-11 ENCOUNTER — OFFICE VISIT (OUTPATIENT)
Dept: SURGERY | Facility: CLINIC | Age: 79
End: 2024-12-11

## 2024-12-11 DIAGNOSIS — N13.8 BPH WITH OBSTRUCTION/LOWER URINARY TRACT SYMPTOMS: ICD-10-CM

## 2024-12-11 DIAGNOSIS — N40.1 BPH WITH OBSTRUCTION/LOWER URINARY TRACT SYMPTOMS: ICD-10-CM

## 2024-12-11 DIAGNOSIS — C61 PROSTATE CANCER (HCC): Primary | ICD-10-CM

## 2024-12-11 DIAGNOSIS — C61 PROSTATE CANCER (HCC): ICD-10-CM

## 2024-12-11 DIAGNOSIS — N52.9 ERECTILE DYSFUNCTION, UNSPECIFIED ERECTILE DYSFUNCTION TYPE: ICD-10-CM

## 2024-12-11 DIAGNOSIS — R97.20 ELEVATED PSA: ICD-10-CM

## 2024-12-11 LAB — PSA SERPL-MCNC: 5.67 NG/ML (ref ?–4)

## 2024-12-11 PROCEDURE — 99204 OFFICE O/P NEW MOD 45 MIN: CPT | Performed by: UROLOGY

## 2024-12-11 PROCEDURE — 36415 COLL VENOUS BLD VENIPUNCTURE: CPT

## 2024-12-11 PROCEDURE — 84153 ASSAY OF PSA TOTAL: CPT

## 2024-12-11 RX ORDER — RIVAROXABAN 20 MG/1
20 TABLET, FILM COATED ORAL
COMMUNITY

## 2024-12-11 RX ORDER — ROSUVASTATIN CALCIUM 10 MG/1
10 TABLET, COATED ORAL NIGHTLY
COMMUNITY
Start: 2024-08-14 | End: 2025-08-09

## 2024-12-23 ENCOUNTER — TELEPHONE (OUTPATIENT)
Dept: SURGERY | Facility: CLINIC | Age: 79
End: 2024-12-23

## 2024-12-23 NOTE — TELEPHONE ENCOUNTER
Per patient's wife calling stating that Mr. Morrissey has not yet received his lab results in MyChart from his lab on 11/12. Please advise.

## 2024-12-23 NOTE — TELEPHONE ENCOUNTER
Called pt's spouse to discuss below.   Verbalized understanding.      Manan Robles,  I have reviewed your PSA. This shows no significant abnormalities. No changes to the plan as we had previously discussed. Please let me know if you have any questions.  Thanks and take care,     Osmar Monreal MD

## 2025-02-07 ENCOUNTER — TELEPHONE (OUTPATIENT)
Dept: SURGERY | Facility: CLINIC | Age: 80
End: 2025-02-07

## 2025-02-07 NOTE — TELEPHONE ENCOUNTER
Called pt's spouse to discuss below.   Pt's last OV was on 12/11/24.     Spouse states pt was seen at urgent care on 1/28/25.  Pt was started on antibiotics, however no UTI confirmed.   Pt and spouse still complain of strong odor to urine.   Denies fever, n/v, urgency, frequency, and incomplete bladder emptying.     Appt made with VIKTOR Ureña on 2/12.   Advised to proceed to ER if symptoms worsen.     This encounter is completed.

## 2025-02-07 NOTE — TELEPHONE ENCOUNTER
Patient wife calling to state she believes patient has a prostate infection because his urine smells, he has developed incontinence at night, and states patient's penis has \"inverted\". Patient has follow up appointment on 5/7 and no sooner appointment available. Please call.

## 2025-04-28 NOTE — PROGRESS NOTES
HPI:     Travis Morrissey III is a 79 year old male with a PMH of obesity, HTN, neuropathy, thrombophilia (on xarelto), b/l LE edema.    Following for:  1. Low risk CaP on AS  - dx in 2014 in [place]  2. BPH/LUTS  - on flomax in the past  3. Fam h/o CaP  - dad dx in 60s    PCP - Jim Botello Urologist - Iveth 11/8/23  LOV 12/11/2024    Presents for f/u.  He feels well. Appetite and energy are good.     He was on flomax in the past and stopped bc he didn't need to take it anymore.    AUA SS is 2/35 with 1 n, w. Mostly happy with LUTS.  Incontinence: none  Pt declines penoscrotal exam and MARK but open to this if PSA rises    Unable to void. Will try to provide UA at NOV.    UTI hx: none  Gross hematuria: none  Tobacco hx: 15 pack years, quit ~ 1980  Kidney stone hx: none  Fam h/o  malignancy: none    Poor potency and prefers observation.    Prior PSAs:  - 6.66 5/5/25  - 6.94 4/30/25  - 5.67 12/11/24  - 5.64 4/24/24  - 6.09 11/2/23  - 7.20 4/21/23  - 5.04 2/15/22  - 5.37 3/19/21  - 4.32 1/16/20  - 3.04 11/29/18  - 3.23 11/28/17  - 2.01 9/14/15    CT AP + IVC 7/28/21: no  abnormalities, sliding hiatal hernia, FLD, constipation, gynecomastia    Reported ~ 20-30 oz water, < 12 oz coffee with medium yellow urine urine. I again encouraged the pt drink at least 40-60 oz water per day or enough to keep urine clear to pale yellow for UTI prevention.    Discussed we could check pMRI to ensure no significant abnormalities or could f/u in 6 mo - 1 y for next check as PSA has risen over the past few years. He prefers follow up in 1 y.    Discussed options for low risk CaP and he wants to continue active surveillance. Observation for BPH/LUTS, ED. Increase water intake for UTI prevention.    HISTORY:  Past Medical History:    Anesthesia complication    BPH (benign prostatic hyperplasia)    Cancer (HCC)    prostate cancer, PSA tracking    Neuropathy    right arm    Reflex sympathetic dystrophy    s/p TKR?      Past  Surgical History:   Procedure Laterality Date    Colonoscopy N/A 12/2/2014    [facility].  Hemorrhoids.  Repeat PRN    Other surgical history Right 2000    knee replacement    Other surgical history Left unknown    orthoscopic    Skin surgery Right 02/27/2019    BCC Right Nasal Ala MMS by MM    Tonsillectomy      Total knee replacement Right 2000    Upper gi endoscopy,biopsy  12/2/14= Normal, SB Bx normal      Family History   Problem Relation Age of Onset    No Known Problems Father     Stroke Mother       Social History:   Social History     Socioeconomic History    Marital status:     Number of children: 1   Occupational History    Occupation: retired      Comment:    Tobacco Use    Smoking status: Former     Current packs/day: 1.00     Average packs/day: 1 pack/day for 20.0 years (20.0 ttl pk-yrs)     Types: Cigarettes, Cigars    Smokeless tobacco: Never    Tobacco comments:     quit smoking cigars in 2013 quit cigarrettes in 1970\"s   Vaping Use    Vaping status: Never Used   Substance and Sexual Activity    Alcohol use: No    Drug use: No   Other Topics Concern     Service No    Blood Transfusions No    Caffeine Concern Yes    Exercise No    Seat Belt Yes   Social History Narrative    Lives with wife    1 daughter            Medications (Active prior to today's visit):  Current Outpatient Medications   Medication Sig Dispense Refill    GABAPENTIN 300 MG Oral Cap TAKE ONE CAPSULE BY MOUTH TWICE DAILY in the morning and before bedtime 180 capsule 1    OLANZAPINE 15 MG Oral Tab TAKE ONE TABLET BY MOUTH NIGHTLY 90 tablet 1    MIRTAZAPINE 15 MG Oral Tab TAKE ONE TABLET BY MOUTH NIGHTLY 90 tablet 1    OLANZapine 5 MG Oral Tab Take 0.5 tablets (2.5 mg total) by mouth every morning. 135 tablet 1    venlafaxine  MG Oral Capsule SR 24 Hr Take 1 capsule (150 mg total) by mouth daily with breakfast. 90 capsule 1    XARELTO 20 MG Oral Tab Take 1 tablet (20 mg total) by  mouth daily with food.      rosuvastatin 10 MG Oral Tab Take 1 tablet (10 mg total) by mouth nightly.      baclofen 10 MG Oral Tab Take 1 tablet (10 mg total) by mouth at bedtime. 90 tablet 0    Betamethasone Dipropionate 0.05 % External Ointment Apply to AA of legs BID for up to 2 weeks, then hold x 2 weeks, repeat as needed. (Patient not taking: Reported on 5/7/2025) 50 g 1       Allergies:  Allergies[1]      ROS:     A comprehensive 10 point review of systems was completed.  Pertinent positives and negatives noted in the the HPI.    PHYSICAL EXAM:     GENERAL APPEARANCE: well, developed, well nourished, in no acute distress  NEUROLOGIC: nonfocal, alert and oriented  HEAD: normocephalic, atraumatic  EYES: sclera non-icteric  EARS: hearing intact  ORAL CAVITY: mucosa moist  NECK/THYROID: no obvious goiter or masses  LUNGS: nonlabored breathing  ABDOMEN: soft, no obvious masses or tenderness  SKIN: no obvious rashes    : as noted above    ASSESSMENT/PLAN:   Diagnoses and all orders for this visit:    Prostate cancer (HCC)  -     PSA Total, Diagnostic; Future    Elevated PSA    BPH with obstruction/lower urinary tract symptoms    Erectile dysfunction, unspecified erectile dysfunction type    - as noted above.    Thanks again for this consult.    Angel Monreal MD, FACS  Urologist  [facility]  Office: [phone]              [1] No Known Allergies

## 2025-05-05 ENCOUNTER — TELEPHONE (OUTPATIENT)
Dept: SURGERY | Facility: CLINIC | Age: 80
End: 2025-05-05

## 2025-05-05 ENCOUNTER — LAB ENCOUNTER (OUTPATIENT)
Dept: LAB | Facility: HOSPITAL | Age: 80
End: 2025-05-05
Attending: UROLOGY
Payer: MEDICARE

## 2025-05-05 DIAGNOSIS — C61 PROSTATE CANCER (HCC): ICD-10-CM

## 2025-05-05 LAB — PSA SERPL-MCNC: 6.66 NG/ML (ref ?–4)

## 2025-05-05 PROCEDURE — 36415 COLL VENOUS BLD VENIPUNCTURE: CPT

## 2025-05-05 PROCEDURE — 84153 ASSAY OF PSA TOTAL: CPT

## 2025-05-07 ENCOUNTER — OFFICE VISIT (OUTPATIENT)
Dept: SURGERY | Facility: CLINIC | Age: 80
End: 2025-05-07

## 2025-05-07 DIAGNOSIS — N40.1 BPH WITH OBSTRUCTION/LOWER URINARY TRACT SYMPTOMS: ICD-10-CM

## 2025-05-07 DIAGNOSIS — R97.20 ELEVATED PSA: ICD-10-CM

## 2025-05-07 DIAGNOSIS — C61 PROSTATE CANCER (HCC): Primary | ICD-10-CM

## 2025-05-07 DIAGNOSIS — N13.8 BPH WITH OBSTRUCTION/LOWER URINARY TRACT SYMPTOMS: ICD-10-CM

## 2025-05-07 DIAGNOSIS — N52.9 ERECTILE DYSFUNCTION, UNSPECIFIED ERECTILE DYSFUNCTION TYPE: ICD-10-CM

## 2025-05-07 PROCEDURE — 99214 OFFICE O/P EST MOD 30 MIN: CPT | Performed by: UROLOGY

## 2025-05-07 PROCEDURE — G2211 COMPLEX E/M VISIT ADD ON: HCPCS | Performed by: UROLOGY

## 2025-08-02 ENCOUNTER — HOSPITAL ENCOUNTER (EMERGENCY)
Facility: HOSPITAL | Age: 80
Discharge: HOME | End: 2025-08-02
Attending: EMERGENCY MEDICINE

## 2025-08-02 ENCOUNTER — APPOINTMENT (OUTPATIENT)
Dept: GENERAL RADIOLOGY | Facility: HOSPITAL | Age: 80
End: 2025-08-02
Attending: EMERGENCY MEDICINE

## 2025-08-02 VITALS
SYSTOLIC BLOOD PRESSURE: 122 MMHG | DIASTOLIC BLOOD PRESSURE: 97 MMHG | RESPIRATION RATE: 16 BRPM | BODY MASS INDEX: 39 KG/M2 | TEMPERATURE: 97 F | WEIGHT: 297.63 LBS | OXYGEN SATURATION: 100 % | HEART RATE: 59 BPM

## 2025-08-02 DIAGNOSIS — R06.00 DYSPNEA, UNSPECIFIED TYPE: Primary | ICD-10-CM

## 2025-08-02 LAB
ALBUMIN SERPL-MCNC: 4.1 G/DL (ref 3.2–4.8)
ALBUMIN/GLOB SERPL: 1.6 (ref 1–2)
ALP LIVER SERPL-CCNC: 122 U/L (ref 45–117)
ALT SERPL-CCNC: 7 U/L (ref 10–49)
ANION GAP SERPL CALC-SCNC: 10 MMOL/L (ref 0–18)
AST SERPL-CCNC: 20 U/L (ref ?–34)
BASOPHILS # BLD AUTO: 0 X10(3) UL (ref 0–0.2)
BASOPHILS NFR BLD AUTO: 0 %
BILIRUB SERPL-MCNC: 0.5 MG/DL (ref 0.2–1.1)
BILIRUB UR QL STRIP.AUTO: NEGATIVE
BUN BLD-MCNC: 14 MG/DL (ref 9–23)
CALCIUM BLD-MCNC: 9.2 MG/DL (ref 8.7–10.6)
CHLORIDE SERPL-SCNC: 110 MMOL/L (ref 98–112)
CLARITY UR REFRACT.AUTO: CLEAR
CO2 SERPL-SCNC: 21 MMOL/L (ref 21–32)
CREAT BLD-MCNC: 1 MG/DL (ref 0.7–1.3)
D DIMER PPP FEU-MCNC: <0.27 UG/ML FEU (ref ?–0.78)
EGFRCR SERPLBLD CKD-EPI 2021: 77 ML/MIN/1.73M2 (ref 60–?)
EOSINOPHIL # BLD AUTO: 0.13 X10(3) UL (ref 0–0.7)
EOSINOPHIL NFR BLD AUTO: 1.8 %
ERYTHROCYTE [DISTWIDTH] IN BLOOD BY AUTOMATED COUNT: 12.6 %
GLOBULIN PLAS-MCNC: 2.5 G/DL (ref 2–3.5)
GLUCOSE BLD-MCNC: 120 MG/DL (ref 70–99)
GLUCOSE UR STRIP.AUTO-MCNC: NORMAL MG/DL
HCT VFR BLD AUTO: 43.7 % (ref 39–53)
HGB BLD-MCNC: 14.8 G/DL (ref 13–17.5)
IMM GRANULOCYTES # BLD AUTO: 0.02 X10(3) UL (ref 0–1)
IMM GRANULOCYTES NFR BLD: 0.3 %
KETONES UR STRIP.AUTO-MCNC: NEGATIVE MG/DL
LEUKOCYTE ESTERASE UR QL STRIP.AUTO: 250
LYMPHOCYTES # BLD AUTO: 0.68 X10(3) UL (ref 1–4)
LYMPHOCYTES NFR BLD AUTO: 9.5 %
MCH RBC QN AUTO: 32.4 PG (ref 26–34)
MCHC RBC AUTO-ENTMCNC: 33.9 G/DL (ref 31–37)
MCV RBC AUTO: 95.6 FL (ref 80–100)
MONOCYTES # BLD AUTO: 0.51 X10(3) UL (ref 0.1–1)
MONOCYTES NFR BLD AUTO: 7.1 %
NEUTROPHILS # BLD AUTO: 5.81 X10 (3) UL (ref 1.5–7.7)
NEUTROPHILS # BLD AUTO: 5.81 X10(3) UL (ref 1.5–7.7)
NEUTROPHILS NFR BLD AUTO: 81.3 %
NITRITE UR QL STRIP.AUTO: NEGATIVE
OSMOLALITY SERPL CALC.SUM OF ELEC: 294 MOSM/KG (ref 275–295)
PH UR STRIP.AUTO: 7 (ref 5–8)
PLATELET # BLD AUTO: 250 10(3)UL (ref 150–450)
POTASSIUM SERPL-SCNC: 4 MMOL/L (ref 3.5–5.1)
PROT SERPL-MCNC: 6.6 G/DL (ref 5.7–8.2)
PROT UR STRIP.AUTO-MCNC: NEGATIVE MG/DL
RBC # BLD AUTO: 4.57 X10(6)UL (ref 3.8–5.8)
RBC UR QL AUTO: NEGATIVE
SODIUM SERPL-SCNC: 141 MMOL/L (ref 136–145)
SP GR UR STRIP.AUTO: 1.02 (ref 1–1.03)
TROPONIN I SERPL HS-MCNC: 4 NG/L (ref ?–53)
TROPONIN I SERPL HS-MCNC: 4 NG/L (ref ?–53)
UROBILINOGEN UR STRIP.AUTO-MCNC: NORMAL MG/DL
WBC # BLD AUTO: 7.2 X10(3) UL (ref 4–11)

## 2025-08-02 PROCEDURE — 81001 URINALYSIS AUTO W/SCOPE: CPT | Performed by: EMERGENCY MEDICINE

## 2025-08-02 PROCEDURE — 71045 X-RAY EXAM CHEST 1 VIEW: CPT | Performed by: EMERGENCY MEDICINE

## 2025-08-02 PROCEDURE — 36415 COLL VENOUS BLD VENIPUNCTURE: CPT

## 2025-08-02 PROCEDURE — 85379 FIBRIN DEGRADATION QUANT: CPT | Performed by: EMERGENCY MEDICINE

## 2025-08-02 PROCEDURE — 93005 ELECTROCARDIOGRAM TRACING: CPT

## 2025-08-02 PROCEDURE — 99285 EMERGENCY DEPT VISIT HI MDM: CPT

## 2025-08-02 PROCEDURE — 80053 COMPREHEN METABOLIC PANEL: CPT | Performed by: EMERGENCY MEDICINE

## 2025-08-02 PROCEDURE — 84484 ASSAY OF TROPONIN QUANT: CPT | Performed by: EMERGENCY MEDICINE

## 2025-08-02 PROCEDURE — 87086 URINE CULTURE/COLONY COUNT: CPT | Performed by: EMERGENCY MEDICINE

## 2025-08-02 PROCEDURE — 85025 COMPLETE CBC W/AUTO DIFF WBC: CPT | Performed by: EMERGENCY MEDICINE

## 2025-08-02 PROCEDURE — 93010 ELECTROCARDIOGRAM REPORT: CPT

## 2025-08-03 LAB
ATRIAL RATE: 64 BPM
P AXIS: 12 DEGREES
P-R INTERVAL: 188 MS
Q-T INTERVAL: 428 MS
QRS DURATION: 86 MS
QTC CALCULATION (BEZET): 441 MS
R AXIS: 49 DEGREES
T AXIS: 94 DEGREES
VENTRICULAR RATE: 64 BPM

## (undated) NOTE — LETTER
[facility] [address], [address] [place] [address]  Consent for Procedure/Sedation  Date: 04/28/2023         Time: 1735    I hereby authorize Gisel Little, my physician and his/her assistants (if applicable), which may include medical students, residents, and/or fellows, to perform the following surgical operation/ procedure and administer such anesthesia as may be determined necessary by my physician:  Operation/Procedure name (s) Peripheral angiography, atherectomy, percutaneous transluminal angioplasty (PTA), EKOS, right heart catheterization, thrombectomy, catheter-directed thrombolysis and/or vascular stenting on Kaye Cover Yuni III  2. I recognize that during the surgical operation/procedure, unforeseen conditions may necessitate additional or different procedures than those listed above. I, therefore, further authorize and request that the above-named surgeon, assistants, or designees perform such procedures as are, in their judgment, necessary and desirable. 3.   My surgeon/physician has discussed prior to my surgery the potential benefits, risks and side effects of this procedure; the likelihood of achieving goals; and potential problems that might occur during recuperation. They also discussed reasonable alternatives to the procedure, including risks, benefits, and side effects related to the alternatives and risks related to not receiving this procedure. I have had all my questions answered and I acknowledge that no guarantee has been made as to the result that may be obtained. 4.   Should the need arise during my operation/procedure, which includes change of level of care prior to discharge, I also consent to the administration of blood and/or blood products. Further, I understand that despite careful testing and screening of blood or blood products by collecting agencies, I may still be subject to ill effects as a result of receiving a blood transfusion and/or blood products.   The following are some, but not all, of the potential risks that can occur: fever and allergic reactions, hemolytic reactions, transmission of diseases such as Hepatitis, AIDS and Cytomegalovirus (CMV) and fluid overload. In the event that I wish to have an autologous transfusion of my own blood, or a directed donor transfusion, I will discuss this with my physician. Check only if Refusing Blood or Blood Products  I understand refusal of blood or blood products as deemed necessary by my physician may have serious consequences to my condition to include possible death. I hereby assume responsibility for my refusal and release the hospital, its personnel, and my physicians from any responsibility for the consequences of my refusal.      o  Refuse        5. I authorize the use of any specimen, organs, tissues, body parts or foreign objects that may be removed from my body during the operation/procedure for diagnosis, research or teaching purposes and their subsequent disposal by hospital authorities. I also authorize the release of specimen test results and/or written reports to my treating physician on the hospital medical staff or other referring or consulting physicians involved in my care, at the discretion of the Pathologist or my treating physician. 6.   I consent to the photographing or videotaping of the operations or procedures to be performed, including appropriate portions of my body for medical, scientific, or educational purposes, provided my identity is not revealed by the pictures or by descriptive texts accompanying them. If the procedure has been photographed/videotaped, the surgeon will obtain the original picture, image, videotape or CD. The hospital will not be responsible for storage, release or maintenance of the picture, image, tape or CD.    7.   I consent to the presence of a  or observers in the operating room as deemed necessary by my physician or their designees.     8.   I recognize that in the event my procedure results in extended X-Ray/fluoroscopy time, I may develop a skin reaction. 9. If I have a Do Not Attempt Resuscitation (DNAR) order in place, that status will be suspended while in the operating room, procedural suite, and during the recovery period unless otherwise explicitly stated by me (or a person authorized to consent on my behalf). The surgeon or my attending physician will determine when the applicable recovery period ends for purposes of reinstating the DNAR order. 10. Patients having a sterilization procedure: I understand that if the procedure is successful the results will be permanent and it will therefore be impossible for me to inseminate, conceive, or bear children. I also understand that the procedure is intended to result in sterility, although the result has not been guaranteed. 11. I acknowledge that my physician has explained sedation/analgesia administration to me including the risk and benefits I consent to the administration of sedation/analgesia as may be necessary or desirable in the judgment of my physician.     I CERTIFY THAT I HAVE READ AND FULLY UNDERSTAND THE ABOVE CONSENT TO OPERATION and/or OTHER PROCEDURE.        ____________________________________       _________________________________      ______________________________  Signature of Patient         Signature of Responsible Person        Printed Name of Responsible Person      ____________________________________      _________________________________      ______________________________       Signature of Witness          Relationship to Patient                       Date                                         Time  Patient Name: Alicia Mishra III     : 1945                 Printed: 2023      Medical Record #: [id number]                      Page 1 of 2